# Patient Record
Sex: FEMALE | Race: WHITE | Employment: OTHER | URBAN - METROPOLITAN AREA
[De-identification: names, ages, dates, MRNs, and addresses within clinical notes are randomized per-mention and may not be internally consistent; named-entity substitution may affect disease eponyms.]

---

## 2017-02-01 ENCOUNTER — APPOINTMENT (OUTPATIENT)
Dept: LAB | Age: 77
End: 2017-02-01
Attending: CHIROPRACTOR
Payer: MEDICARE

## 2017-02-01 DIAGNOSIS — T15.81XA FOREIGN BODY OF SCLERA OF RIGHT EYE, INITIAL ENCOUNTER: ICD-10-CM

## 2017-02-01 DIAGNOSIS — T15.81XA: ICD-10-CM

## 2017-02-01 PROCEDURE — 88305 TISSUE EXAM BY PATHOLOGIST: CPT

## 2017-04-28 ENCOUNTER — HOSPITAL ENCOUNTER (OUTPATIENT)
Dept: BONE DENSITY | Age: 77
Discharge: HOME | End: 2017-04-28
Attending: SPECIALIST
Payer: MEDICARE

## 2017-04-28 DIAGNOSIS — T38.6X5A OSTEOPOROSIS DUE TO AROMATASE INHIBITOR: ICD-10-CM

## 2017-04-28 DIAGNOSIS — M81.8 OSTEOPOROSIS DUE TO AROMATASE INHIBITOR: ICD-10-CM

## 2017-04-28 PROCEDURE — 77080 DXA BONE DENSITY AXIAL: CPT

## 2017-05-12 ENCOUNTER — OFFICE VISIT (OUTPATIENT)
Dept: HEMATOLOGY/ONCOLOGY | Facility: HOSPITAL | Age: 77
End: 2017-05-12
Attending: SPECIALIST
Payer: MEDICARE

## 2017-05-12 VITALS
OXYGEN SATURATION: 97 % | HEART RATE: 85 BPM | WEIGHT: 146.38 LBS | RESPIRATION RATE: 18 BRPM | TEMPERATURE: 97 F | HEIGHT: 62.01 IN | DIASTOLIC BLOOD PRESSURE: 81 MMHG | SYSTOLIC BLOOD PRESSURE: 153 MMHG | BODY MASS INDEX: 26.6 KG/M2

## 2017-05-12 DIAGNOSIS — G35 MULTIPLE SCLEROSIS (HCC): Primary | ICD-10-CM

## 2017-05-12 DIAGNOSIS — M81.8 OSTEOPOROSIS DUE TO AROMATASE INHIBITOR: ICD-10-CM

## 2017-05-12 DIAGNOSIS — Z17.0 MALIGNANT NEOPLASM OF UPPER-OUTER QUADRANT OF RIGHT BREAST IN FEMALE, ESTROGEN RECEPTOR POSITIVE (HCC): ICD-10-CM

## 2017-05-12 DIAGNOSIS — T38.6X5A OSTEOPOROSIS DUE TO AROMATASE INHIBITOR: ICD-10-CM

## 2017-05-12 DIAGNOSIS — C50.411 MALIGNANT NEOPLASM OF UPPER-OUTER QUADRANT OF RIGHT BREAST IN FEMALE, ESTROGEN RECEPTOR POSITIVE (HCC): ICD-10-CM

## 2017-05-12 PROCEDURE — 99214 OFFICE O/P EST MOD 30 MIN: CPT | Performed by: SPECIALIST

## 2017-05-12 RX ORDER — ANASTROZOLE 1 MG/1
1 TABLET ORAL DAILY
Qty: 90 TABLET | Refills: 3 | Status: SHIPPED | COMMUNITY
Start: 2017-05-12 | End: 2018-04-04

## 2017-05-12 NOTE — PROGRESS NOTES
Patient is here today for follow up with Dr. Miko Felix for breast cancer. Patient stated back pain today is rated an 8 on a scale of 0-10 with movement. Patient is on Anastrozole therapy. Medication list and medical history were reviewed and updated.     Educ

## 2017-05-13 PROBLEM — M81.0 OSTEOPOROSIS: Status: ACTIVE | Noted: 2017-05-13

## 2017-05-14 NOTE — PROGRESS NOTES
[facility] Progress Note      Patient Name: Pratima Cobb   YOB: 1940  Medical Record Number: [id number]  Attending Physician: Rayna Wheeler M.D.      Date of Visit: 5/12/2017      Chief Complaint  Breast cancer, right sided, uppe 15 years; menopause age 35+; first full term pregnancy age 31 years; ; no history of hormone replacement therapy or OCP. Family History (historical data, reviewed)  Mother with bladder cancer.     Social History   Current tobacco use of 0.5 packs/day palpable masses bilaterally. Respiratory Normal effort; no respiratory distress; clear to auscultation bilaterally. Cardiovascular Regular rate and rhythm; normal S1S2. Abdomen Soft; no masses. Integumentary Skin is warm and dry; no rashes.    Neurolo zoledronic acid therapy; she will return in 6 months for follow up. Risk Level: High - breast cancer on therapy. Electronically signed by:    Alfonzo Phipps M.D.   [facility]  Director, Bone and Soft Tissue Sarcoma Program  S

## 2017-05-30 ENCOUNTER — TELEPHONE (OUTPATIENT)
Dept: HEMATOLOGY/ONCOLOGY | Facility: HOSPITAL | Age: 77
End: 2017-05-30

## 2017-05-30 NOTE — TELEPHONE ENCOUNTER
MD Rosangela Kirkpatrick, RN                     When I saw her, I gave her order for vitamin D level which she was going to get done at [address] with other labs ordered by her PCP. Can you find out if lab done?  If not, can you ask patient whe

## 2017-06-09 NOTE — TELEPHONE ENCOUNTER
Telephone call from patient stated had labs drawn today at [address]. Results should be faxed next week.

## 2017-10-23 ENCOUNTER — TELEPHONE (OUTPATIENT)
Dept: HEMATOLOGY/ONCOLOGY | Facility: HOSPITAL | Age: 77
End: 2017-10-23

## 2017-10-23 DIAGNOSIS — C50.411 MALIGNANT NEOPLASM OF UPPER-OUTER QUADRANT OF RIGHT BREAST IN FEMALE, ESTROGEN RECEPTOR POSITIVE (HCC): Primary | ICD-10-CM

## 2017-10-23 DIAGNOSIS — Z17.0 MALIGNANT NEOPLASM OF UPPER-OUTER QUADRANT OF RIGHT BREAST IN FEMALE, ESTROGEN RECEPTOR POSITIVE (HCC): Primary | ICD-10-CM

## 2018-04-06 RX ORDER — ANASTROZOLE 1 MG/1
TABLET ORAL
Qty: 90 TABLET | Refills: 0 | Status: SHIPPED | OUTPATIENT
Start: 2018-04-06 | End: 2020-06-05

## 2020-06-05 ENCOUNTER — APPOINTMENT (OUTPATIENT)
Dept: GENERAL RADIOLOGY | Facility: HOSPITAL | Age: 80
DRG: 607 | End: 2020-06-05
Attending: EMERGENCY MEDICINE
Payer: MEDICARE

## 2020-06-05 ENCOUNTER — HOSPITAL ENCOUNTER (INPATIENT)
Facility: HOSPITAL | Age: 80
LOS: 2 days | Discharge: NURSING, REHAB OR LONG-TERM CARE | DRG: 607 | End: 2020-06-10
Attending: EMERGENCY MEDICINE | Admitting: INTERNAL MEDICINE
Payer: MEDICARE

## 2020-06-05 ENCOUNTER — APPOINTMENT (OUTPATIENT)
Dept: CT IMAGING | Facility: HOSPITAL | Age: 80
DRG: 607 | End: 2020-06-05
Attending: EMERGENCY MEDICINE
Payer: MEDICARE

## 2020-06-05 ENCOUNTER — APPOINTMENT (OUTPATIENT)
Dept: ULTRASOUND IMAGING | Facility: HOSPITAL | Age: 80
DRG: 607 | End: 2020-06-05
Attending: EMERGENCY MEDICINE
Payer: MEDICARE

## 2020-06-05 DIAGNOSIS — R77.8 ELEVATED TROPONIN: ICD-10-CM

## 2020-06-05 DIAGNOSIS — I89.0 LYMPHEDEMA: Primary | ICD-10-CM

## 2020-06-05 PROBLEM — R79.89 ELEVATED TROPONIN: Status: ACTIVE | Noted: 2020-06-05

## 2020-06-05 PROCEDURE — 99221 1ST HOSP IP/OBS SF/LOW 40: CPT | Performed by: INTERNAL MEDICINE

## 2020-06-05 PROCEDURE — 99220 INITIAL OBSERVATION CARE,LEVL III: CPT | Performed by: HOSPITALIST

## 2020-06-05 PROCEDURE — 93970 EXTREMITY STUDY: CPT | Performed by: EMERGENCY MEDICINE

## 2020-06-05 PROCEDURE — 71275 CT ANGIOGRAPHY CHEST: CPT | Performed by: EMERGENCY MEDICINE

## 2020-06-05 PROCEDURE — 71045 X-RAY EXAM CHEST 1 VIEW: CPT | Performed by: EMERGENCY MEDICINE

## 2020-06-05 RX ORDER — KETOROLAC TROMETHAMINE 30 MG/ML
30 INJECTION, SOLUTION INTRAMUSCULAR; INTRAVENOUS EVERY 6 HOURS PRN
Status: DISCONTINUED | OUTPATIENT
Start: 2020-06-05 | End: 2020-06-05 | Stop reason: DRUGHIGH

## 2020-06-05 RX ORDER — MORPHINE SULFATE 4 MG/ML
4 INJECTION, SOLUTION INTRAMUSCULAR; INTRAVENOUS EVERY 2 HOUR PRN
Status: DISCONTINUED | OUTPATIENT
Start: 2020-06-05 | End: 2020-06-06

## 2020-06-05 RX ORDER — ASPIRIN 325 MG
325 TABLET ORAL DAILY
Status: DISCONTINUED | OUTPATIENT
Start: 2020-06-05 | End: 2020-06-07

## 2020-06-05 RX ORDER — LEVOTHYROXINE SODIUM 0.07 MG/1
75 TABLET ORAL
Status: DISCONTINUED | OUTPATIENT
Start: 2020-06-06 | End: 2020-06-10

## 2020-06-05 RX ORDER — FUROSEMIDE 10 MG/ML
40 INJECTION INTRAMUSCULAR; INTRAVENOUS DAILY
Status: DISCONTINUED | OUTPATIENT
Start: 2020-06-05 | End: 2020-06-05

## 2020-06-05 RX ORDER — METOCLOPRAMIDE HYDROCHLORIDE 5 MG/ML
10 INJECTION INTRAMUSCULAR; INTRAVENOUS EVERY 8 HOURS PRN
Status: DISCONTINUED | OUTPATIENT
Start: 2020-06-05 | End: 2020-06-07

## 2020-06-05 RX ORDER — KETOROLAC TROMETHAMINE 30 MG/ML
30 INJECTION, SOLUTION INTRAMUSCULAR; INTRAVENOUS ONCE
Status: COMPLETED | OUTPATIENT
Start: 2020-06-05 | End: 2020-06-05

## 2020-06-05 RX ORDER — LEVOTHYROXINE SODIUM 0.07 MG/1
75 TABLET ORAL
COMMUNITY

## 2020-06-05 RX ORDER — MORPHINE SULFATE 4 MG/ML
2 INJECTION, SOLUTION INTRAMUSCULAR; INTRAVENOUS EVERY 2 HOUR PRN
Status: DISCONTINUED | OUTPATIENT
Start: 2020-06-05 | End: 2020-06-06

## 2020-06-05 RX ORDER — HYDROCODONE BITARTRATE AND ACETAMINOPHEN 5; 325 MG/1; MG/1
1 TABLET ORAL EVERY 6 HOURS PRN
Status: DISCONTINUED | OUTPATIENT
Start: 2020-06-05 | End: 2020-06-07

## 2020-06-05 RX ORDER — ENOXAPARIN SODIUM 100 MG/ML
40 INJECTION SUBCUTANEOUS EVERY EVENING
Status: DISCONTINUED | OUTPATIENT
Start: 2020-06-05 | End: 2020-06-07

## 2020-06-05 RX ORDER — KETOROLAC TROMETHAMINE 30 MG/ML
15 INJECTION, SOLUTION INTRAMUSCULAR; INTRAVENOUS EVERY 6 HOURS PRN
Status: DISCONTINUED | OUTPATIENT
Start: 2020-06-05 | End: 2020-06-07

## 2020-06-05 RX ORDER — ACETAMINOPHEN 325 MG/1
650 TABLET ORAL EVERY 6 HOURS PRN
Status: DISCONTINUED | OUTPATIENT
Start: 2020-06-05 | End: 2020-06-10

## 2020-06-05 RX ORDER — BACLOFEN 10 MG/1
5 TABLET ORAL 3 TIMES DAILY
Status: DISCONTINUED | OUTPATIENT
Start: 2020-06-05 | End: 2020-06-06

## 2020-06-05 RX ORDER — MORPHINE SULFATE 4 MG/ML
1 INJECTION, SOLUTION INTRAMUSCULAR; INTRAVENOUS EVERY 2 HOUR PRN
Status: DISCONTINUED | OUTPATIENT
Start: 2020-06-05 | End: 2020-06-06

## 2020-06-05 RX ORDER — ONDANSETRON 2 MG/ML
4 INJECTION INTRAMUSCULAR; INTRAVENOUS EVERY 6 HOURS PRN
Status: DISCONTINUED | OUTPATIENT
Start: 2020-06-05 | End: 2020-06-10

## 2020-06-05 NOTE — CONSULTS
[facility]  Cardiology Consultation    [facility] Patient Status:  Observation    1940 MRN [id number]   [facility] 3NE-A Attending Hunter Duran MD   Hosp Day # 0 PCP Dat Taveras MD     Reason for Consultation:  Amos Brandt She has never used smokeless tobacco. She reports that she does not drink alcohol or use drugs.     Allergies:  No Known Allergies    Medications:    Current Facility-Administered Medications:   •  [START ON 6/6/2020] Levothyroxine Sodium tab 75 mcg, 75 mcg b/l in LE with mild erythema above ankles  Neurologic: Alert and oriented, normal affect. Skin: Warm and dry.      Laboratory Data:  Lab Results   Component Value Date    WBC 5.8 06/05/2020    HGB 12.0 06/05/2020    HCT 36.0 06/05/2020    .0 06/05/2

## 2020-06-05 NOTE — ED NOTES
Ct Angiography, Chest (cpt=71275)    Result Date: 6/5/2020  PROCEDURE:  CT ANGIOGRAPHY, CHEST (CPT=71275)  COMPARISON:  None.   INDICATIONS:  leg pain, elevated ddimer, eval for PE  TECHNIQUE:  IV contrast-enhanced multislice CT angiography is performed thr Venous Doppler Leg Bilat - Diag Img (cpt=93970)    Result Date: 6/5/2020  PROCEDURE:  US VENOUS DOPPLER LEG BILAT - DIAG IMG (CPT=93970)  COMPARISON:  None.   INDICATIONS:  leg pain  TECHNIQUE:  Real time, grey scale, and duplex ultrasound was used to evalu Finalized by: Rosa Khan MD on 6/05/2020 at 10:14 AM        Patient was signed out that if the CT shows no pulmonary embolism patient will need can be admitted.   CT shows no PE.

## 2020-06-05 NOTE — ED NOTES
Pt returned from CT. Tolerated well. Denies new complaints. Pain improved. Awaiting CT results for admission.

## 2020-06-05 NOTE — H&P
[facility]  History and Physical     Cristhian Escort Patient Status:  Emergency    1940 MRN [id number]   [address] Attending WillMar MD   Hosp Day # 0 PCP Servando Rivera MD     Chief Compla REPAIR RETINAL DETACH, CPLX  04/2013    right eye   • TONSILLECTOMY         Social History:  reports that she has been smoking. She has been smoking about 0.50 packs per day.  She has never used smokeless tobacco. She reports that she does not drink alcohol 0.197*          Imaging: Imaging data reviewed in Epic. ASSESSMENT / PLAN:     1. Right groin pain, tender on exam, appears to have pulled muscle  1. Muscle relaxants  2. Pain control  3. PT/OT evaluation  2.  Lymphedema, doppler negative - patie

## 2020-06-05 NOTE — ED INITIAL ASSESSMENT (HPI)
Arrives via [facility] from home with c/o right leg pain. Normally uses a motorized w/c, d/t MS, and a walker. States was attempting to get up to use the BR about a week ago and had a sock on her right foot.   When grabbed the bar, leg slid and began havi

## 2020-06-05 NOTE — ED PROVIDER NOTES
Patient Seen in: [facility] Emergency Department      History   Patient presents with:  Lower Extremity Injury    Stated Complaint: leg pain    HPI    The patient is an 50-year-old female with a history of multiple sclerosis that is progressively be at [address] [facility] MAIN OR   • [place]   • LUMPECTOMY RIGHT Right 9/2014    [facility]   • NEEDLE BIOPSY RIGHT Right 8/2014    US guided biopsy - [facility]   • OTHER      detached retina repair right eye   • OTHER SURGICAL HISTORY  9/2014    Right ananda Speaking full sentences without any distress or retractions. Clear to auscultation bilaterally no wheezes or rales or rhonchi. Abdomen: Normoactive bowel sounds. Soft, nondistended. No HSM or masses.  Nontender throughout abdomen to superficial and deep pa created for panel order CBC WITH DIFFERENTIAL WITH PLATELET.   Procedure                               Abnormality         Status                     ---------                               -----------         ------                     CBC W/ DIFFERENTIAL[ transcribed by Technologist)  bilateral lower     extremity pain               FINDINGS:  Covid-19 protocol. Compression is demonstrated of the common     femoral, superficial femoral and popliteal venous segments bilaterally.          CONCLUSION:  [facility] service will admit the patient, for diuresis, and likely case management evaluation. At change of shift, CTA of her chest is still pending.   I discussed this patient with the oncoming ER physician will follow up with a CTA chest, and patient will be admit

## 2020-06-06 ENCOUNTER — APPOINTMENT (OUTPATIENT)
Dept: CV DIAGNOSTICS | Facility: HOSPITAL | Age: 80
DRG: 607 | End: 2020-06-06
Attending: HOSPITALIST
Payer: MEDICARE

## 2020-06-06 PROBLEM — R26.9 GAIT DIFFICULTY: Status: ACTIVE | Noted: 2020-06-06

## 2020-06-06 PROCEDURE — 93306 TTE W/DOPPLER COMPLETE: CPT | Performed by: HOSPITALIST

## 2020-06-06 PROCEDURE — 99226 SUBSEQUENT OBSERVATION CARE: CPT | Performed by: HOSPITALIST

## 2020-06-06 PROCEDURE — 99232 SBSQ HOSP IP/OBS MODERATE 35: CPT | Performed by: INTERNAL MEDICINE

## 2020-06-06 PROCEDURE — 99223 1ST HOSP IP/OBS HIGH 75: CPT | Performed by: OTHER

## 2020-06-06 RX ORDER — KETOROLAC TROMETHAMINE 30 MG/ML
30 INJECTION, SOLUTION INTRAMUSCULAR; INTRAVENOUS EVERY 6 HOURS PRN
Status: DISCONTINUED | OUTPATIENT
Start: 2020-06-06 | End: 2020-06-06

## 2020-06-06 RX ORDER — KETOROLAC TROMETHAMINE 30 MG/ML
15 INJECTION, SOLUTION INTRAMUSCULAR; INTRAVENOUS EVERY 6 HOURS PRN
Status: DISCONTINUED | OUTPATIENT
Start: 2020-06-06 | End: 2020-06-06

## 2020-06-06 RX ORDER — BACLOFEN 10 MG/1
5 TABLET ORAL 3 TIMES DAILY PRN
Status: DISCONTINUED | OUTPATIENT
Start: 2020-06-06 | End: 2020-06-10

## 2020-06-06 RX ORDER — POTASSIUM CHLORIDE 20 MEQ/1
40 TABLET, EXTENDED RELEASE ORAL EVERY 4 HOURS
Status: COMPLETED | OUTPATIENT
Start: 2020-06-06 | End: 2020-06-06

## 2020-06-06 NOTE — PLAN OF CARE
A&O x 4. Room air. Tele, NSR. Bumex drip. Bilateral lower extremity edema noted. Daily wt. Bertha Ip in. I&O's. Lovenox. Denied pain. VSS. Total lift, per PT. Cardiac diet. Will cont to monitor.           Problem: Patient/Family Goals  Goal: Patient based on assessment  - Modify environment to reduce risk of injury  - Provide assistive devices as appropriate  - Consider OT/PT consult to assist with strengthening/mobility  - Encourage toileting schedule  Outcome: Progressing     Problem: DISCHARGE PLAN

## 2020-06-06 NOTE — CM/SW NOTE
06/06/20 1500   CM/SW Referral Data   Referral Source Social Work (self-referral)   Reason for Referral Discharge planning   Informant Patient   Patient Info   Patient's Mental Status Alert;Oriented   Patient's [address]  [facility] Chat[employer]ingual

## 2020-06-06 NOTE — PLAN OF CARE
Received patient awake in bed. AOx4. Pleasant. On room air, clear lungs. NSR on tele. Electrolyte protocol. On Lovenox for VTE. Daily weights. On pure wick with large amount of output. On Bumex drip at 1mg/hr. PT/OT to see. Neurology to see.  Has 2 small marleen Identify cognitive and physical deficits and behaviors that affect risk of falls.   - [place] fall precautions as indicated by assessment.  - Educate pt/family on patient safety including physical limitations  - Instruct pt to call for assistance with act

## 2020-06-06 NOTE — PLAN OF CARE
Assumed pt care from ER  A/O x4   Room air   NSR on tele  [place] cath was put in place and works  Denies of pain   Cardiac diet   Electrolyte protocol   Daily weight  Lovenox   Left AC SL  One time dose lasix  All needs are met and will continue to monitor Progressing  6/5/2020 1905 by Anila Mckeon, RN  Outcome: Progressing  6/5/2020 1812 by Anila Mckeon, RN  Outcome: Progressing     Problem: SAFETY ADULT - FALL  Goal: Free from fall injury  Description  INTERVENTIONS:  - Assess pt frequently for physica RN  Outcome: Progressing

## 2020-06-06 NOTE — PROGRESS NOTES
[facility]  Cardiology Progress Note    Subjective:  No chest pain or shortness of breath. Urinating \"a lot\".       Objective:  /55 (BP Location: Left arm)   Pulse 102   Temp 98.2 °F (36.8 °C) (Oral)   Resp 18   Ht 5' 2\" (1.575 m)   Wt 173 lb long-standing for >20 years  · Hypothyroidism  · Hx Breast Ca  · Mildly elevated troponin - echo pending. Not a candidate for any invasive procedures as outlined by Dr. Jose Manuel Dwyer.     Plan:    - Continue bumex gtt  - Await echo  - Once distal lower extremi

## 2020-06-06 NOTE — CONSULTS
[facility]    Report of Consultation    Yonas Purpura Patient Status:  Observation    1940 MRN [id number]   [facility] 3NE-A Attending Remigio Guerra MD   Hosp Day # 0 PCP Chelsea Skelton MD     Date of Admission:  20 9/2014    [facility]   • NEEDLE BIOPSY RIGHT Right 8/2014    US guided biopsy - [facility]   • OTHER      detached retina repair right eye   • OTHER SURGICAL HISTORY  9/2014    Right breast lumpectomy   • RADIATION RIGHT  9/2014   • REPAIR RETINAL DETACH, CPLX  04/2013 Clear to auscultation bilaterally. HEART:  S1, S2, Regular rate and rhythm. NEUROLOGICAL:  This patient is alert and orientated x 3. Speech fluent. Able to follow simple commands. Face is symmetrical. Pupils equally round and reactive to light.   3+ br

## 2020-06-06 NOTE — PHYSICAL THERAPY NOTE
PHYSICAL THERAPY EVALUATION - INPATIENT     Room Number: 7182/1564-I  Evaluation Date: 6/6/2020  Type of Evaluation: Initial  Physician Order: PT Eval and Treat    Presenting Problem: RLE pain, lymphedema  Reason for Therapy: Mobility Dysfunction and foot slip out from under her on commode and she began having R thigh/groin pain. She also reports injuring her L shoulder 2 weeks ago. Pt has been unable to get into her bed and was sleeping in dining room chair.  She has part time caregiver for errands/malika bedclothes, sheets and blankets)?: A Lot   -   Sitting down on and standing up from a chair with arms (e.g., wheelchair, bedside commode, etc.): Unable   -   Moving from lying on back to sitting on the side of the bed?: A Lot   How much help from another p with the following impairments decreased AROM/strength BUE/LEs,lymphedema, decreased balance, pain with mobility, impaired activity tolerance. Functional outcome measures completed include AMPAC.   Based on this evaluation, patient's clinical presentation

## 2020-06-06 NOTE — PROGRESS NOTES
[facility]  Progress Note     Amita Espinosa Patient Status:  Observation    1940 MRN [id number]   [facility] 3NE-A Attending Alessandra Dumont MD   Hosp Day # 0 PCP Neo Matos MD     Chief Complaint: Lymphedema    S: Pa mEq Oral Q4H   • Levothyroxine Sodium  75 mcg Oral Before breakfast   • enoxaparin  40 mg Subcutaneous QPM   • aspirin  325 mg Oral Daily       ASSESSMENT / PLAN:     1. Right groin pain, tender on exam, appears to have pulled muscle, improving  1.  Muscle

## 2020-06-07 PROCEDURE — 99232 SBSQ HOSP IP/OBS MODERATE 35: CPT | Performed by: NURSE PRACTITIONER

## 2020-06-07 PROCEDURE — 99233 SBSQ HOSP IP/OBS HIGH 50: CPT | Performed by: HOSPITALIST

## 2020-06-07 RX ORDER — ASPIRIN 81 MG/1
81 TABLET ORAL DAILY
Status: DISCONTINUED | OUTPATIENT
Start: 2020-06-07 | End: 2020-06-10

## 2020-06-07 RX ORDER — METOCLOPRAMIDE HYDROCHLORIDE 5 MG/ML
5 INJECTION INTRAMUSCULAR; INTRAVENOUS EVERY 8 HOURS PRN
Status: DISCONTINUED | OUTPATIENT
Start: 2020-06-07 | End: 2020-06-09

## 2020-06-07 RX ORDER — POLYETHYLENE GLYCOL 3350 17 G/17G
17 POWDER, FOR SOLUTION ORAL DAILY
Status: DISCONTINUED | OUTPATIENT
Start: 2020-06-07 | End: 2020-06-10

## 2020-06-07 RX ORDER — TORSEMIDE 20 MG/1
20 TABLET ORAL DAILY
Status: DISCONTINUED | OUTPATIENT
Start: 2020-06-08 | End: 2020-06-08

## 2020-06-07 RX ORDER — ENOXAPARIN SODIUM 100 MG/ML
30 INJECTION SUBCUTANEOUS EVERY EVENING
Status: DISCONTINUED | OUTPATIENT
Start: 2020-06-07 | End: 2020-06-09

## 2020-06-07 RX ORDER — SENNOSIDES 8.6 MG
8.6 TABLET ORAL 2 TIMES DAILY
Status: DISCONTINUED | OUTPATIENT
Start: 2020-06-07 | End: 2020-06-10

## 2020-06-07 RX ORDER — DOCUSATE SODIUM 100 MG/1
100 CAPSULE, LIQUID FILLED ORAL 2 TIMES DAILY
Status: DISCONTINUED | OUTPATIENT
Start: 2020-06-07 | End: 2020-06-10

## 2020-06-07 NOTE — PROGRESS NOTES
[facility]  Progress Note     Marycruz Conner Patient Status:  Observation    1940 MRN [id number]   [facility] 3NE-A Attending Sergei Fenton MD   Hosp Day # 0 PCP Ana Don MD     Chief Complaint: Lymphedema    S: Pa (H)).    No results for input(s): PTP, INR in the last 168 hours. Recent Labs   Lab 06/05/20  0913   TROP 0.197*               Imaging: Imaging data reviewed in Epic.     Medications:   • aspirin EC  81 mg Oral Daily   • [START ON 6/8/2020] jeremy MD

## 2020-06-07 NOTE — CM/SW NOTE
NASIR spoke to RN and discussed contact information for sonRigo. The phone numbers in Epic are not correct and correct number for Rigo beck is [phone]. Epic is now updated. NASIR explained to Rigo Patel that referrals for SNF were sent via [facility].   Rigo Patel states th

## 2020-06-07 NOTE — PLAN OF CARE
Assumed patient care at 1900  Patient A&O x 4  Complaints of pain to (R) groin. Patient refusing baclofen and norco but did take tylenol and heat packs applied to area.  Frequent repositioning offered   VSS  Tele-SR  Lovenox Sub Q  Daily weight  Strict I&O FALL  Goal: Free from fall injury  Description  INTERVENTIONS:  - Assess pt frequently for physical needs  - Identify cognitive and physical deficits and behaviors that affect risk of falls.   - [place] fall precautions as indicated by assessment.  - Educ

## 2020-06-07 NOTE — PROGRESS NOTES
[facility]  Cardiology Progress Note    Subjective:  No chest pain or shortness of breath. Feels weak. Edema close to baseline.     Objective:  /62 (BP Location: Left arm)   Pulse 96   Temp 98.3 °F (36.8 °C) (Oral)   Resp 20   Ht 5' 2\" (1.575 chronic lymphedema - Echo wihtout diastolic dysfunction and preserved LVSF. Was placed on bumex gtt w/ net volume losses thus far ~6Liters. Tells me she had NOT been on oral diuretics at home.   Reports she has tried compression therapy in the past but sh

## 2020-06-07 NOTE — CM/SW NOTE
SW spoke to [place]. Pt is now considering [facility] placement. Pt's son is requesting a call. SW attempted to speak with pt's Jimbo Jeanna [phone] and [phone]. SW unable to leave a message and both phone numbers ring.     SW completed SNF referrals for pt via

## 2020-06-07 NOTE — PROGRESS NOTES
Pharmacy Note: Renal dose adjustment for Metoclopramide (Reglan)  Johanna Falcon has been prescribed Metoclopramide (Reglan) 10 mg every 8 hours as needed. Estimated Creatinine Clearance: 28.2 mL/min (A) (based on SCr of 1.26 mg/dL (H)).     Her calculate

## 2020-06-07 NOTE — PLAN OF CARE
Assumed patient care at 0700  Patient A&O x 4  Complaints of generalized pain, Frequent repositioning offered, Tylenol given   VSS  Tele-SR  Lovenox Sub Q  Daily weight  Strict I&O   Purewick in place  Bumex drip d/c this am, will begin PO diuretics tomor FALL  Goal: Free from fall injury  Description  INTERVENTIONS:  - Assess pt frequently for physical needs  - Identify cognitive and physical deficits and behaviors that affect risk of falls.   - [place] fall precautions as indicated by assessment.  - Educ

## 2020-06-07 NOTE — PROGRESS NOTES
[facility] Pharmacy Note: Renal dose adjustment for Enoxaparin (Lovenox)  Johanna Pedraza has been prescribed Enoxaparin (Lovenox)  40 mg subcutaneously every 24 hours. Estimated Creatinine Clearance: 28.2 mL/min (A) (based on SCr of 1.26 mg/dL (H)).     Her becca

## 2020-06-08 PROBLEM — I50.9 CHF (CONGESTIVE HEART FAILURE) (HCC): Status: ACTIVE | Noted: 2020-06-08

## 2020-06-08 PROCEDURE — 99232 SBSQ HOSP IP/OBS MODERATE 35: CPT | Performed by: NURSE PRACTITIONER

## 2020-06-08 PROCEDURE — 99233 SBSQ HOSP IP/OBS HIGH 50: CPT | Performed by: HOSPITALIST

## 2020-06-08 RX ORDER — AMMONIUM LACTATE 12 G/100G
LOTION TOPICAL AS NEEDED
Status: DISCONTINUED | OUTPATIENT
Start: 2020-06-08 | End: 2020-06-10

## 2020-06-08 RX ORDER — SODIUM CHLORIDE 9 MG/ML
INJECTION, SOLUTION INTRAVENOUS CONTINUOUS
Status: DISCONTINUED | OUTPATIENT
Start: 2020-06-08 | End: 2020-06-09

## 2020-06-08 NOTE — PLAN OF CARE
Patient is A&O x4. VSS- NSR on tele with occasional PVCs. SpO2 remains stable on RA. Patient c/o right leg pain- PRN tylenol and hot packs used. Patient denies nausea/SOB/headache. BLE lymphedema noted R>L- plan to start PO diuretics per cards tomorrow.  PI fall injury  Description  INTERVENTIONS:  - Assess pt frequently for physical needs  - Identify cognitive and physical deficits and behaviors that affect risk of falls.   - [place] fall precautions as indicated by assessment.  - Educate pt/family on patie

## 2020-06-08 NOTE — PLAN OF CARE
Assumed care at 0730. A&O x 4. On RA tolerating well. NSR on tele with occasional PVCs. Patient has bilateral lymphedema, diuretic stopped, fluids started d/t creatine.  Patient is a total lift, reluctant to get out of bed, both Mds education patient on imp fall injury  Description  INTERVENTIONS:  - Assess pt frequently for physical needs  - Identify cognitive and physical deficits and behaviors that affect risk of falls.   - [place] fall precautions as indicated by assessment.  - Educate pt/family on patie

## 2020-06-08 NOTE — CONSULTS
[facility]  Inpatient Wound Care Contact Note    Loretta Jeff Patient Status:  Inpatient    1940 MRN [id number]   [facility] 3NE-A Attending Kofi Martinez MD   Hosp Day # 0 PCP Denilson Stevenson MD     Consult received, exmikey

## 2020-06-08 NOTE — PROGRESS NOTES
Brief Note    Recent Labs   Lab 06/06/20  0642 06/06/20  1611 06/07/20  0713 06/08/20  0659   GLU 85  --  83 85   BUN 22*  --  25* 34*   CREATSERUM 0.56  --  1.26* 2.26*   GFRAA 102  --  46* 23*   GFRNAA 88  --  40* 20*   CA 8.4*  --  8.7 8.5     --

## 2020-06-08 NOTE — OCCUPATIONAL THERAPY NOTE
OCCUPATIONAL THERAPY EVALUATION - INPATIENT     Room Number: 3197/8370-W  Evaluation Date: 6/8/2020  Type of Evaluation: Initial  Presenting Problem: Lymphedema    Physician Order: IP Consult to Occupational Therapy  Reason for Therapy: ADL/IADL Dysfunctio retired  Hand Dominance: Right          Prior Level of Function: Pt reports IND at home several weeks PTA, however has needed increased assist 2/2 slow progressing MS. Pt reports pulling groin muscle PTA during fall in bathroom several weeks PTA.  Pt has si rinsing, drying)?: Total  -   Toileting, which includes using toilet, bedpan or urinal? : Total  -   Putting on and taking off regular upper body clothing?: A Lot  -   Taking care of personal grooming such as brushing teeth?: A Little  -   Eating meals?: A transfers. The patient is functioning below her previous functional level and would benefit from skilled inpatient OT to address the above deficits, maximizing patient’s ability to return safely to her prior level of function.     Patient Complexity  Occ

## 2020-06-08 NOTE — PROGRESS NOTES
[facility]  Cardiology Progress Note    Subjective:  No chest pain or shortness of breath. Creatinine elevated today.     Objective:  /59 (BP Location: Left arm)   Pulse 92   Temp 99 °F (37.2 °C) (Oral)   Resp 18   Ht 62\"   Wt 174 lb 3.2 oz   S asymptomatic PAT    Assessment:    · RLE/groin pain - felt to be muscular  · Progressive edema w/ known chronic lymphedema - Echo wihtout diastolic dysfunction and preserved LVSF.   Was placed on bumex gtt w/ net volume losses thus far ~6.7Liters, now with

## 2020-06-08 NOTE — PROGRESS NOTES
[facility]  Progress Note     Yani Laona Patient Status:  Observation    1940 MRN [id number]   [facility] 3NE-A Attending Stanford Barboza MD   Hosp Day # 0 PCP Mary Navarro MD     Chief Complaint: Lymphedema    S: Pa TP 6.0* 6.1*  --   --   --        Estimated Creatinine Clearance: 15.7 mL/min (A) (based on SCr of 2.26 mg/dL (H)). No results for input(s): PTP, INR in the last 168 hours.     Recent Labs   Lab 06/05/20  0913   TROP 0.197*               Imaging:

## 2020-06-09 PROCEDURE — 99232 SBSQ HOSP IP/OBS MODERATE 35: CPT | Performed by: HOSPITALIST

## 2020-06-09 PROCEDURE — 99232 SBSQ HOSP IP/OBS MODERATE 35: CPT | Performed by: NURSE PRACTITIONER

## 2020-06-09 RX ORDER — POTASSIUM CHLORIDE 20 MEQ/1
40 TABLET, EXTENDED RELEASE ORAL EVERY 4 HOURS
Status: COMPLETED | OUTPATIENT
Start: 2020-06-09 | End: 2020-06-09

## 2020-06-09 RX ORDER — METOCLOPRAMIDE HYDROCHLORIDE 5 MG/ML
10 INJECTION INTRAMUSCULAR; INTRAVENOUS EVERY 8 HOURS PRN
Status: DISCONTINUED | OUTPATIENT
Start: 2020-06-09 | End: 2020-06-10

## 2020-06-09 RX ORDER — ENOXAPARIN SODIUM 100 MG/ML
40 INJECTION SUBCUTANEOUS EVERY EVENING
Status: DISCONTINUED | OUTPATIENT
Start: 2020-06-09 | End: 2020-06-10

## 2020-06-09 NOTE — CM/SW NOTE
MSW reviewed chart, and spoke to son Brett Mora who wants [facility]. MSW spoke to Admissions at [facility] and they will not accept patient if pt is on any IVABEX. MSW updated son on this barrier and to keep 2nd choice in mind.     2pm  MSW spoke to bedside

## 2020-06-09 NOTE — PLAN OF CARE
Assumed pt care at 0730. A&Ox4. Room air. VSS. NSR on tele. Right arm precautions maintained. L AC PIV SL. Cardiac diet, pt tolerating well. Denies N/V. Reports 5/10 pain in BLE, addressed with PRN tylenol. Total lift.    Lovenox SQ for VTE pre activity and pre-medicate as appropriate  Outcome: Progressing     Problem: SAFETY ADULT - FALL  Goal: Free from fall injury  Description  INTERVENTIONS:  - Assess pt frequently for physical needs  - Identify cognitive and physical deficits and behaviors t Impaired Activities of Daily Living  Goal: Achieve highest/safest level of independence in self care  Description  Interventions:  - Assess ability and encourage patient to participate in ADLs to maximize function  - Promote sitting position while performi

## 2020-06-09 NOTE — PROGRESS NOTES
[facility]  Cardiology Progress Note    Subjective:  No chest pain or shortness of breath.     Objective:  /62 (BP Location: Left arm)   Pulse 83   Temp 98.9 °F (37.2 °C) (Oral)   Resp 14   Ht 62\"   Wt 168 lb   SpO2 96%   BMI 30.73 kg/m²     Lab PVC's    Assessment:    · RLE/groin pain - felt to be muscular  · Progressive edema w/ known chronic lymphedema - Echo wihtout diastolic dysfunction and preserved LVSF.  Was placed on bumex gtt, then ROBBIE so diuretics stopped.   Renal fx now normalized  · AK

## 2020-06-09 NOTE — PHYSICAL THERAPY NOTE
PHYSICAL THERAPY TREATMENT NOTE - INPATIENT    Room Number: 6832/3602-Z     Session: 1     Number of Visits to Meet Established Goals: 6    Presenting Problem: RLE pain, lymphedema    History related to current admission: Pt admitted from home with RLE after activity, which happens, she says  Management Techniques: Relaxation;Repositioning; Activity promotion    BALANCE                                                                                                                       Static Sitting: Goo below.  Sit <> stand as above, x8 with seated rest between reps. Mod assist to try to help pt scoot back in b/s chair without success. Pt remained on edge. T/f back to bed via total lift for perineal care and brief change.      Activity recommendations established on 6/6/2020 6/9/2020 all goals ongoing. Therapist in surgical mask. Patient in nothing. Therapist accompanied by no one.

## 2020-06-09 NOTE — PROGRESS NOTES
[facility]  Progress Note     Monica Lopez Patient Status:  Observation    1940 MRN [id number]   [facility] 3NE-A Attending Melyssa Grissom MD   Hosp Day # 1 PCP Nicki Gabriel MD     Chief Complaint: Lymphedema    S: Pa ALT 29 30  --   --   --   --    BILT 0.4 0.5  --   --   --   --    TP 6.0* 6.1*  --   --   --   --     < > = values in this interval not displayed. Estimated Creatinine Clearance: 72.4 mL/min (A) (based on SCr of 0.49 mg/dL (L)).     No results for

## 2020-06-09 NOTE — PROGRESS NOTES
Brief Note:    Recent Labs   Lab 06/07/20  0713 06/08/20  0659 06/09/20  0621   GLU 83 85 86   BUN 25* 34* 20*   CREATSERUM 1.26* 2.26* 0.49*   GFRAA 46* 23* 106   GFRNAA 40* 20* 92   CA 8.7 8.5 8.2*    136 138   K 4.1 4.3 3.6    100 105   CO2

## 2020-06-09 NOTE — PROGRESS NOTES
Pharmacy Renal Adjustment Note    Patient's renal function has improved. Creatinine clearance is estimated at 72.4 ml/min. As a result, doses of Lovenox and Reglan have been changed back to dosing that was initially ordered.  Dosing will be resumed at [facility]

## 2020-06-09 NOTE — PLAN OF CARE
A&Ox4. Room air. NSR on tele. VSS. Lomeli in place with adequate output. Mild pain in legs, mainly right groin area. Declined intervention. Daily weight. Strict I&O. PIV- IVF. Mepilex placed on sacrum for mild redness. Repositioned with pillow support.  Legs [place] fall precautions as indicated by assessment.  - Educate pt/family on patient safety including physical limitations  - Instruct pt to call for assistance with activity based on assessment  - Modify environment to reduce risk of injury  - Provide a while performing ADLs such as feeding, grooming, and bathing  - Educate and encourage patient/family in tolerated functional activity level and precautions during self-care     Outcome: Progressing

## 2020-06-10 VITALS
TEMPERATURE: 99 F | DIASTOLIC BLOOD PRESSURE: 41 MMHG | RESPIRATION RATE: 22 BRPM | HEIGHT: 62 IN | BODY MASS INDEX: 30.91 KG/M2 | WEIGHT: 168 LBS | HEART RATE: 85 BPM | SYSTOLIC BLOOD PRESSURE: 100 MMHG | OXYGEN SATURATION: 93 %

## 2020-06-10 PROCEDURE — 99232 SBSQ HOSP IP/OBS MODERATE 35: CPT | Performed by: NURSE PRACTITIONER

## 2020-06-10 PROCEDURE — 99239 HOSP IP/OBS DSCHRG MGMT >30: CPT | Performed by: HOSPITALIST

## 2020-06-10 RX ORDER — ASPIRIN 81 MG/1
81 TABLET ORAL DAILY
Qty: 90 TABLET | Refills: 0 | Status: SHIPPED | OUTPATIENT
Start: 2020-06-11 | End: 2020-09-09

## 2020-06-10 NOTE — PROGRESS NOTES
[facility]  Cardiology Progress Note    Subjective:  No chest pain or shortness of breath. Some left arm pain which she associates with increased activity and \"pulling\" herself up with therapy.      Objective:  /50 (BP Location: Left arm)   Pul gain at the time of admission. Was placed on bumex gtts but developed ROBBIE. Diuretics stopped. · ROBBIE: now resolved w IVF   · MS: limited mobility. Chronic.    · Hypothyroidism: on replacement   · Hx breast cancer    Plan:  · No further diuretics  · Jerry Ken

## 2020-06-10 NOTE — PROGRESS NOTES
[facility] HOSPITALIST  Progress Note     Viv Tanyas Patient Status:  Inpatient    1940 MRN [id number]   [facility] [place] 3NE-A Attending Adrianna Washington MD   Hosp Day # 2 PCP Kathleen Goldberg MD     Chief Complaint: pelvic pain   S: on SCr of 0.49 mg/dL (L)). No results for input(s): PTP, INR in the last 168 hours. Recent Labs   Lab 06/05/20  0913   TROP 0.197*           Imaging: Imaging data reviewed in Epic.   Medications:   • enoxaparin  40 mg Subcutaneous QPM   • aspirin EC

## 2020-06-10 NOTE — CM/SW NOTE
Rn updated MSW that patient is ready for DC. MSW spoke to Admissions Ryan Taylor and she is agreeable to accept patient. MSW spoke to son kayden who is agreeable to dc.     Salvador Cox [phone]  BLS at 3pm

## 2020-06-10 NOTE — PLAN OF CARE
NURSING DISCHARGE NOTE    Discharged Rehab facility via Ambulance. Accompanied by Support staff  Belongings Taken by patient/family. Pt discharged in calm, stable status to [facility] via ambulance. Report called to [facility] @ [phone] ([phone]).  Trans

## 2020-06-10 NOTE — PLAN OF CARE
Resumed pt care at 0730. A&Ox4. Room air. VSS. NSR on tele. Right arm precautions maintained. L AC PIV SL. Cardiac diet, pt tolerating well. Denies N/V. Reports mild generalized pain, addressed with repositioning. Total lift.    Lovenox SQ for Progressing     Problem: SAFETY ADULT - FALL  Goal: Free from fall injury  Description  INTERVENTIONS:  - Assess pt frequently for physical needs  - Identify cognitive and physical deficits and behaviors that affect risk of falls.   - [place] fall precaut Activities of Daily Living  Goal: Achieve highest/safest level of independence in self care  Description  Interventions:  - Assess ability and encourage patient to participate in ADLs to maximize function  - Promote sitting position while performing ADLs s

## 2020-06-11 ENCOUNTER — INITIAL APN SNF VISIT (OUTPATIENT)
Dept: INTERNAL MEDICINE CLINIC | Age: 80
End: 2020-06-11

## 2020-06-11 ENCOUNTER — EXTERNAL FACILITY (OUTPATIENT)
Dept: FAMILY MEDICINE CLINIC | Facility: CLINIC | Age: 80
End: 2020-06-11

## 2020-06-11 DIAGNOSIS — R79.89 LOW SERUM VITAMIN D: ICD-10-CM

## 2020-06-11 DIAGNOSIS — N17.9 AKI (ACUTE KIDNEY INJURY) (HCC): ICD-10-CM

## 2020-06-11 DIAGNOSIS — R33.9 URINARY RETENTION: ICD-10-CM

## 2020-06-11 DIAGNOSIS — G35 MULTIPLE SCLEROSIS (HCC): ICD-10-CM

## 2020-06-11 DIAGNOSIS — Z79.899 MEDICATION MANAGEMENT: ICD-10-CM

## 2020-06-11 DIAGNOSIS — R77.8 ELEVATED TROPONIN: ICD-10-CM

## 2020-06-11 DIAGNOSIS — I89.0 LYMPHEDEMA: ICD-10-CM

## 2020-06-11 DIAGNOSIS — G35 MULTIPLE SCLEROSIS (HCC): Primary | ICD-10-CM

## 2020-06-11 DIAGNOSIS — M79.604 PAIN OF RIGHT LOWER EXTREMITY: Primary | ICD-10-CM

## 2020-06-11 DIAGNOSIS — Z96.0 PRESENCE OF INDWELLING URINARY CATHETER: ICD-10-CM

## 2020-06-11 DIAGNOSIS — I50.9 CONGESTIVE HEART FAILURE, UNSPECIFIED HF CHRONICITY, UNSPECIFIED HEART FAILURE TYPE (HCC): ICD-10-CM

## 2020-06-11 PROCEDURE — 99306 1ST NF CARE HIGH MDM 50: CPT | Performed by: FAMILY MEDICINE

## 2020-06-11 PROCEDURE — 99310 SBSQ NF CARE HIGH MDM 45: CPT | Performed by: NURSE PRACTITIONER

## 2020-06-11 PROCEDURE — 1111F DSCHRG MED/CURRENT MED MERGE: CPT | Performed by: FAMILY MEDICINE

## 2020-06-11 NOTE — DISCHARGE SUMMARY
[facility] HOSPITALIST  DISCHARGE SUMMARY     Dulce Maria Oneal Patient Status:  Inpatient    1940 MRN [id number]   [facility] 3NE-A Attending No att. providers found   Hosp Day # 2 PCP Nyasia Russ MD     Date of Admission: 2020 slipped away she she was getting on toilet because she was wearing socks. Patient states she did not injure her RLE d/t contact with anything but felt like she pulled a muscle. Patient has also noted that she has progressively worsening lower ext swelling. [facility]    Schedule an appointment as soon as possible for a visit in 1 week      --------------------------------------------------------------------------------------  PATIENT DISCHARGE INSTRUCTIONS: See electronic chart

## 2020-06-12 RX ORDER — ERGOCALCIFEROL 1.25 MG/1
50000 CAPSULE ORAL WEEKLY
COMMUNITY

## 2020-06-12 RX ORDER — ACETAMINOPHEN 325 MG/1
650 TABLET ORAL EVERY 6 HOURS PRN
COMMUNITY

## 2020-06-12 NOTE — PROGRESS NOTES
Johanna Garcia Author: Sudeep Ley MD     1940 MRN [id number]   [facility]  Admission 20      Last Hospital Discharge 6/10/20 PCP Sameer Mclaughlin MD   Hospital of Discharge  [facility]        CC --ad[facility] to [facility] from [facility] M.S,  and slight retention problem   • MS (multiple sclerosis) ([facility] [address]     x 21 years    • Muscle weakness    • Neuropathy     right foot from M.s   • Unspecified disorder of thyroid    • Vertigo 6/2016     Past Surgical History:   Procedure Laterality Date 115/50  PHYSICAL EXAM:  GENERAL: well developed, well nourished, in no apparent distress  SKIN: no rashes, no suspicious lesions  Wound; no open wounds  HEENT: atraumatic, normocephalic, ears and throat are clear  EYES: PERRLA, EOMI, conjunctiva are clear failure type [facility]    • - Comprehensive subacute rehab with:  - PT to work on ambulation, gait, balance, strength, endurance, transfers, safety  - OT to work on fine motor skills, ADLs, hygiene, toileting, transfers, safety  - 24h nursing for medication ad

## 2020-06-12 NOTE — PROGRESS NOTES
Anna Tan  : 1940  Age [de-identified]year old  female patient is admitted to Facility: [facility] for [address] [facility] date:  20  Discharge date to [facility]:  6/10/20  ELOS:  13-15 days  Anticipated discharge date:  20  Advanced Directive UA neg, resolved  1. Stopped IVF  2. Lomeli replaced- outpt f/u    3. Lymphedema, doppler negative - patient was on diuretics, but stopped taking them d/t need to urinate and limitations with mobility, BNP normal, PCT neg, ECHO with preserved EF  4.  Elevate Problem Relation Age of Onset   • Cancer Mother 77        Bladder cancer   • Breast Cancer Self 75     Social History    Tobacco Use      Smoking status: Current Every Day Smoker        Packs/day: 0.50      Smokeless tobacco: Never Used    Alcohol use: N catheter  SKIN: ---dry patch to anterior RLE; wound RN to follow  EYES: conjunctiva normal, no drainage from eyes  HENT: normocephalic; normal nose, no nasal drainage, mucous membranes pink, moist  RESPIRATORY:clear to auscultation  CARDIOVASCULAR: S1, S2 records, notes, lab and imaging results reviewed. Medication reconciliation completed.         Assessment and Plan:  Physical Deconditioning/Weakness; hx of falling  PT/OT/ST to evaluate and treat  VIOLET team to establish care plan meeting with patient and P

## 2020-06-17 ENCOUNTER — SNF VISIT (OUTPATIENT)
Dept: INTERNAL MEDICINE CLINIC | Age: 80
End: 2020-06-17

## 2020-06-17 VITALS
SYSTOLIC BLOOD PRESSURE: 109 MMHG | DIASTOLIC BLOOD PRESSURE: 66 MMHG | OXYGEN SATURATION: 95 % | TEMPERATURE: 97 F | HEART RATE: 82 BPM | RESPIRATION RATE: 20 BRPM

## 2020-06-17 DIAGNOSIS — Z74.1 REQUIRES ASSISTANCE WITH ACTIVITIES OF DAILY LIVING (ADL): ICD-10-CM

## 2020-06-17 DIAGNOSIS — G35 MULTIPLE SCLEROSIS (HCC): ICD-10-CM

## 2020-06-17 DIAGNOSIS — Z96.0 PRESENCE OF INDWELLING URINARY CATHETER: ICD-10-CM

## 2020-06-17 DIAGNOSIS — Z87.898 HISTORY OF URINARY RETENTION: ICD-10-CM

## 2020-06-17 DIAGNOSIS — I89.0 LYMPHEDEMA OF BOTH LOWER EXTREMITIES: Primary | ICD-10-CM

## 2020-06-17 PROCEDURE — 99309 SBSQ NF CARE MODERATE MDM 30: CPT | Performed by: NURSE PRACTITIONER

## 2020-06-17 NOTE — PROGRESS NOTES
[address] [facility], 6/23/1940, [de-identified]year old, female    Chief Complaint:  Patient presents with:   Follow - Up  Weakness  [facility] Admit date:  6/5/20  Discharge date to [facility]:  6/10/20  ELOS:  13-15 days  Anticipated discharge date:  6/25/20  Adv soft, nondistended; no visible masses; nontender, no guarding  :saleh to gravity, draining yellow urine  MUSCULOSKELETAL: weakness r/t recent hospitalization/diagnoses/sequelae; will undergo therapies to rehab and improve strength, endurance and independ [id number]  [phone]     Follow-Up with specialists as recommended.     LEANNE Bolivar  6/17/2020  9:45 AM

## 2020-06-19 ENCOUNTER — SNF VISIT (OUTPATIENT)
Dept: INTERNAL MEDICINE CLINIC | Age: 80
End: 2020-06-19

## 2020-06-19 VITALS
SYSTOLIC BLOOD PRESSURE: 111 MMHG | TEMPERATURE: 98 F | HEART RATE: 82 BPM | RESPIRATION RATE: 18 BRPM | OXYGEN SATURATION: 95 % | DIASTOLIC BLOOD PRESSURE: 51 MMHG

## 2020-06-19 DIAGNOSIS — I89.0 LYMPHEDEMA OF BOTH LOWER EXTREMITIES: Primary | ICD-10-CM

## 2020-06-19 DIAGNOSIS — Z87.898 HISTORY OF URINARY RETENTION: ICD-10-CM

## 2020-06-19 DIAGNOSIS — G35 MULTIPLE SCLEROSIS (HCC): ICD-10-CM

## 2020-06-19 DIAGNOSIS — Z96.0 PRESENCE OF INDWELLING URINARY CATHETER: ICD-10-CM

## 2020-06-19 PROCEDURE — 99307 SBSQ NF CARE SF MDM 10: CPT | Performed by: NURSE PRACTITIONER

## 2020-06-19 NOTE — PROGRESS NOTES
[address] [facility], 6/23/1940, [de-identified]year old, female    Chief Complaint:  Patient presents with:   Follow - Up: Lymphedema     [facility] Admit date:  6/5/20  Discharge date to [facility]:  6/10/20  ELOS:  13-15 days  Anticipated discharge date:  6/25/20  Advanced D 2+, no cyanosis, lymphedema+; significant BLEs; R>L  NEUROLOGIC: follows commands  PSYCHIATRIC: alert and oriented x 3; affect appropriate    Medications reviewed: Yes    Diagnostics reviewed:  No new results available for review.     Assessment and plan:

## 2020-06-23 ENCOUNTER — SNF VISIT (OUTPATIENT)
Dept: INTERNAL MEDICINE CLINIC | Age: 80
End: 2020-06-23

## 2020-06-23 DIAGNOSIS — Z74.1 REQUIRES ASSISTANCE WITH ACTIVITIES OF DAILY LIVING (ADL): Primary | ICD-10-CM

## 2020-06-23 DIAGNOSIS — G35 MULTIPLE SCLEROSIS (HCC): ICD-10-CM

## 2020-06-23 DIAGNOSIS — Z96.0 PRESENCE OF INDWELLING URINARY CATHETER: ICD-10-CM

## 2020-06-23 DIAGNOSIS — I89.0 LYMPHEDEMA OF BOTH LOWER EXTREMITIES: ICD-10-CM

## 2020-06-23 PROCEDURE — 99308 SBSQ NF CARE LOW MDM 20: CPT | Performed by: NURSE PRACTITIONER

## 2020-06-25 ENCOUNTER — SNF DISCHARGE (OUTPATIENT)
Dept: INTERNAL MEDICINE CLINIC | Age: 80
End: 2020-06-25

## 2020-06-25 VITALS
SYSTOLIC BLOOD PRESSURE: 149 MMHG | RESPIRATION RATE: 20 BRPM | TEMPERATURE: 98 F | HEART RATE: 79 BPM | DIASTOLIC BLOOD PRESSURE: 65 MMHG | OXYGEN SATURATION: 96 %

## 2020-06-25 VITALS
DIASTOLIC BLOOD PRESSURE: 60 MMHG | TEMPERATURE: 97 F | SYSTOLIC BLOOD PRESSURE: 95 MMHG | OXYGEN SATURATION: 96 % | RESPIRATION RATE: 20 BRPM | HEART RATE: 70 BPM

## 2020-06-25 DIAGNOSIS — I89.0 LYMPHEDEMA OF BOTH LOWER EXTREMITIES: Primary | ICD-10-CM

## 2020-06-25 DIAGNOSIS — G35 MULTIPLE SCLEROSIS (HCC): ICD-10-CM

## 2020-06-25 DIAGNOSIS — Z87.898 HISTORY OF URINARY RETENTION: ICD-10-CM

## 2020-06-25 DIAGNOSIS — R79.89 LOW SERUM VITAMIN D: ICD-10-CM

## 2020-06-25 DIAGNOSIS — Z74.1 REQUIRES ASSISTANCE WITH ACTIVITIES OF DAILY LIVING (ADL): ICD-10-CM

## 2020-06-25 PROCEDURE — 99316 NF DSCHRG MGMT 30 MIN+: CPT | Performed by: NURSE PRACTITIONER

## 2020-06-25 NOTE — PROGRESS NOTES
Johanna Brown, 6/23/1940, 79year old, female    Chief Complaint:  Patient presents with:   Follow - Up  Weakness  Urinary     [facility] Admit date:  6/5/20  Discharge date to [facility]:  6/10/20  ELOS:  13-15 days  Anticipated discharge date:  6/25/20  Adv nondistended; no visible masses; nontender, no guarding  :saelh to gravity, draining yellow urine  MUSCULOSKELETAL: weakness r/t recent hospitalization/diagnoses/sequelae; will continue therapies to rehab and improve strength, endurance and independence Dr Sara Torres [address] [phone]     Follow-Up with specialists as recommended.     LEANNE Mcleod  6/23/2020 10:20 AM

## 2020-06-25 NOTE — PROGRESS NOTES
Johanna Al , 6/23/1940, 79year old, female is being discharged from Facility: [address] [place] [address]    Date of Admission: 6/10/20  Date of Discharge:  6/25/20                            Admitting Diagnoses:  UTI, lymphedema, MS, pain, weaknes catheter d/c'd 6/23--pt refused to have it removed earlier  · Upon discharge, this patient will need home health nursing for disease and medication management, PT/OT/ST to evaluate and treat, CNA/bath aid,  and DME as recommended by rehab ther

## 2021-01-27 DIAGNOSIS — Z23 NEED FOR VACCINATION: ICD-10-CM

## 2022-06-25 ENCOUNTER — APPOINTMENT (OUTPATIENT)
Dept: GENERAL RADIOLOGY | Facility: HOSPITAL | Age: 82
End: 2022-06-25
Attending: EMERGENCY MEDICINE
Payer: MEDICARE

## 2022-06-25 ENCOUNTER — HOSPITAL ENCOUNTER (EMERGENCY)
Facility: HOSPITAL | Age: 82
Discharge: NURSING, REHAB OR LONG-TERM CARE | End: 2022-06-25
Attending: EMERGENCY MEDICINE
Payer: MEDICARE

## 2022-06-25 ENCOUNTER — HOSPITAL ENCOUNTER (EMERGENCY)
Facility: HOSPITAL | Age: 82
Discharge: NURSING, REHAB OR LONG-TERM CARE | End: 2022-06-25
Attending: EMERGENCY MEDICINE
Payer: MEDICARE

## 2022-06-25 VITALS
RESPIRATION RATE: 18 BRPM | SYSTOLIC BLOOD PRESSURE: 139 MMHG | DIASTOLIC BLOOD PRESSURE: 68 MMHG | TEMPERATURE: 98 F | HEART RATE: 75 BPM | HEIGHT: 62 IN | OXYGEN SATURATION: 90 % | WEIGHT: 145 LBS | BODY MASS INDEX: 26.68 KG/M2

## 2022-06-25 DIAGNOSIS — U07.1 COVID-19: ICD-10-CM

## 2022-06-25 DIAGNOSIS — N30.00 ACUTE CYSTITIS WITHOUT HEMATURIA: ICD-10-CM

## 2022-06-25 DIAGNOSIS — S46.911A STRAIN OF RIGHT SHOULDER, INITIAL ENCOUNTER: Primary | ICD-10-CM

## 2022-06-25 LAB
BILIRUB UR QL STRIP.AUTO: NEGATIVE
COLOR UR AUTO: YELLOW
GLUCOSE UR STRIP.AUTO-MCNC: NEGATIVE MG/DL
KETONES UR STRIP.AUTO-MCNC: 20 MG/DL
NITRITE UR QL STRIP.AUTO: POSITIVE
PH UR STRIP.AUTO: 6 [PH] (ref 5–8)
PROT UR STRIP.AUTO-MCNC: NEGATIVE MG/DL
RBC #/AREA URNS AUTO: >10 /HPF
RBC UR QL AUTO: NEGATIVE
SARS-COV-2 RNA RESP QL NAA+PROBE: DETECTED
SP GR UR STRIP.AUTO: 1.01 (ref 1–1.03)
UROBILINOGEN UR STRIP.AUTO-MCNC: <2 MG/DL
WBC #/AREA URNS AUTO: >50 /HPF

## 2022-06-25 PROCEDURE — 87186 SC STD MICRODIL/AGAR DIL: CPT | Performed by: EMERGENCY MEDICINE

## 2022-06-25 PROCEDURE — 87086 URINE CULTURE/COLONY COUNT: CPT | Performed by: EMERGENCY MEDICINE

## 2022-06-25 PROCEDURE — 73030 X-RAY EXAM OF SHOULDER: CPT | Performed by: EMERGENCY MEDICINE

## 2022-06-25 PROCEDURE — 87077 CULTURE AEROBIC IDENTIFY: CPT | Performed by: EMERGENCY MEDICINE

## 2022-06-25 PROCEDURE — 97530 THERAPEUTIC ACTIVITIES: CPT

## 2022-06-25 PROCEDURE — 81001 URINALYSIS AUTO W/SCOPE: CPT | Performed by: EMERGENCY MEDICINE

## 2022-06-25 PROCEDURE — 97162 PT EVAL MOD COMPLEX 30 MIN: CPT

## 2022-06-25 PROCEDURE — 99285 EMERGENCY DEPT VISIT HI MDM: CPT

## 2022-06-25 RX ORDER — AMOXICILLIN 500 MG/1
500 TABLET, FILM COATED ORAL 2 TIMES DAILY
Qty: 14 TABLET | Refills: 0 | Status: SHIPPED | OUTPATIENT
Start: 2022-06-25 | End: 2022-07-02

## 2022-06-25 RX ORDER — ACETAMINOPHEN 500 MG
1000 TABLET ORAL ONCE
Status: COMPLETED | OUTPATIENT
Start: 2022-06-25 | End: 2022-06-25

## 2022-06-25 RX ORDER — AMOXICILLIN 500 MG/1
500 CAPSULE ORAL ONCE
Status: COMPLETED | OUTPATIENT
Start: 2022-06-25 | End: 2022-06-25

## 2022-06-25 NOTE — ED INITIAL ASSESSMENT (HPI)
Pt presents to ed via ems from home c/o lower ext pain after falling on Friday morning at 0400 while attempting to transfer to commode at home with caregiver, pt denies head or neck injury, denies loc

## 2022-06-25 NOTE — ED QUICK NOTES
Son at bedside. Updated on status. Unable to get up. Is in a device similar to an electric w/c at home. Family calling for caregiver to meet patient at home.

## 2022-06-25 NOTE — CM/SW NOTE
Patient accepted at [facility] [address] room and patient will need to bring her own MS medications. PT updated. Son to return to bedside soon. Per patient, she does not take any MS medications. Updated MD and RN. Waiting on number to call RN report and time patient can discharge.

## 2022-06-25 NOTE — ED QUICK NOTES
Son concerned about patient's ability to transfer to and from w/c, as this was an issue at home, which led to the fall. Attempted to assist with staff members x 2 from bed to w/c and w/c to bed. Was unable to tolerate. Dr. Susan Stone notified.

## 2022-06-25 NOTE — CM/SW NOTE
Asked to see patient regarding living arrangements at home. Patient has MS, is wheelchair bound and has had a 24 hour caregiver for the last 2 years. Patient can normally stand and pivot with assistance. When road tested in the ED, patient is unable to stand with assistance. PT eval order placed. PASRR completed and referral placed in [facility]. Per MD, patient is Covid +. Will see if any facilities are accepting Covid + patients.

## 2022-06-27 ENCOUNTER — INITIAL APN SNF VISIT (OUTPATIENT)
Dept: INTERNAL MEDICINE CLINIC | Age: 82
End: 2022-06-27

## 2022-06-27 VITALS — OXYGEN SATURATION: 90 % | HEART RATE: 50 BPM

## 2022-06-27 DIAGNOSIS — R53.81 PHYSICAL DECONDITIONING: ICD-10-CM

## 2022-06-27 DIAGNOSIS — B96.20 E. COLI UTI (URINARY TRACT INFECTION): ICD-10-CM

## 2022-06-27 DIAGNOSIS — B37.0 ORAL CANDIDIASIS: ICD-10-CM

## 2022-06-27 DIAGNOSIS — E03.9 HYPOTHYROIDISM, UNSPECIFIED TYPE: ICD-10-CM

## 2022-06-27 DIAGNOSIS — U07.1 COVID-19: ICD-10-CM

## 2022-06-27 DIAGNOSIS — N39.0 E. COLI UTI (URINARY TRACT INFECTION): ICD-10-CM

## 2022-06-27 DIAGNOSIS — Z78.9 IMPAIRED MOBILITY AND ADLS: ICD-10-CM

## 2022-06-27 DIAGNOSIS — I89.0 LYMPHEDEMA: ICD-10-CM

## 2022-06-27 DIAGNOSIS — S46.911D STRAIN OF RIGHT SHOULDER, SUBSEQUENT ENCOUNTER: ICD-10-CM

## 2022-06-27 DIAGNOSIS — G35 MULTIPLE SCLEROSIS (HCC): ICD-10-CM

## 2022-06-27 DIAGNOSIS — I50.9 CHRONIC CONGESTIVE HEART FAILURE, UNSPECIFIED HEART FAILURE TYPE (HCC): ICD-10-CM
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
## 2022-06-27 DIAGNOSIS — Z74.09 IMPAIRED MOBILITY AND ADLS: ICD-10-CM

## 2022-06-27 RX ORDER — ALBUTEROL SULFATE 90 UG/1
2 AEROSOL, METERED RESPIRATORY (INHALATION) EVERY 4 HOURS PRN
COMMUNITY

## 2022-06-27 RX ORDER — ACETAMINOPHEN 500 MG
1000 TABLET ORAL EVERY 6 HOURS PRN
COMMUNITY

## 2022-06-29 ENCOUNTER — SNF DISCHARGE (OUTPATIENT)
Dept: INTERNAL MEDICINE CLINIC | Age: 82
End: 2022-06-29

## 2022-06-29 VITALS
SYSTOLIC BLOOD PRESSURE: 122 MMHG | RESPIRATION RATE: 18 BRPM | HEART RATE: 85 BPM | OXYGEN SATURATION: 99 % | DIASTOLIC BLOOD PRESSURE: 64 MMHG | TEMPERATURE: 98 F

## 2022-06-29 DIAGNOSIS — B96.20 E. COLI UTI (URINARY TRACT INFECTION): ICD-10-CM

## 2022-06-29 DIAGNOSIS — R53.81 PHYSICAL DECONDITIONING: ICD-10-CM

## 2022-06-29 DIAGNOSIS — S46.911D STRAIN OF RIGHT SHOULDER, SUBSEQUENT ENCOUNTER: ICD-10-CM

## 2022-06-29 DIAGNOSIS — I89.0 LYMPHEDEMA: ICD-10-CM

## 2022-06-29 DIAGNOSIS — G35 MULTIPLE SCLEROSIS (HCC): ICD-10-CM

## 2022-06-29 DIAGNOSIS — Z74.09 IMPAIRED MOBILITY AND ADLS: ICD-10-CM

## 2022-06-29 DIAGNOSIS — U07.1 COVID-19: ICD-10-CM

## 2022-06-29 DIAGNOSIS — I50.9 CHRONIC CONGESTIVE HEART FAILURE, UNSPECIFIED HEART FAILURE TYPE (HCC): ICD-10-CM

## 2022-06-29 DIAGNOSIS — E03.9 HYPOTHYROIDISM, UNSPECIFIED TYPE: ICD-10-CM

## 2022-06-29 DIAGNOSIS — N39.0 E. COLI UTI (URINARY TRACT INFECTION): ICD-10-CM

## 2022-06-29 DIAGNOSIS — J18.9 PNEUMONIA DUE TO INFECTIOUS ORGANISM, UNSPECIFIED LATERALITY, UNSPECIFIED PART OF LUNG: Primary | ICD-10-CM
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
## 2022-06-29 DIAGNOSIS — Z78.9 IMPAIRED MOBILITY AND ADLS: ICD-10-CM

## 2022-06-29 DIAGNOSIS — B37.0 ORAL CANDIDIASIS: ICD-10-CM

## 2022-06-29 PROCEDURE — 99316 NF DSCHRG MGMT 30 MIN+: CPT | Performed by: NURSE PRACTITIONER

## 2022-06-29 RX ORDER — NIRMATRELVIR AND RITONAVIR 300-100 MG
KIT ORAL 2 TIMES DAILY
COMMUNITY
Start: 2022-06-28 | End: 2022-07-02

## 2022-06-29 RX ORDER — NICOTINE 14MG/24HR
250 PATCH, TRANSDERMAL 24 HOURS TRANSDERMAL 2 TIMES DAILY
COMMUNITY
Start: 2022-06-29 | End: 2022-07-10

## 2022-06-29 RX ORDER — AZITHROMYCIN 250 MG/1
250 TABLET, FILM COATED ORAL DAILY
COMMUNITY
Start: 2022-06-29 | End: 2022-07-03

## 2022-06-30 ENCOUNTER — PATIENT OUTREACH (OUTPATIENT)
Dept: CASE MANAGEMENT | Age: 82
End: 2022-06-30

## 2022-06-30 DIAGNOSIS — Z02.9 ENCOUNTERS FOR UNSPECIFIED ADMINISTRATIVE PURPOSE: ICD-10-CM

## 2022-06-30 DIAGNOSIS — S46.911A STRAIN OF RIGHT SHOULDER, INITIAL ENCOUNTER: ICD-10-CM

## 2022-06-30 PROCEDURE — 1111F DSCHRG MED/CURRENT MED MERGE: CPT

## 2022-11-01 ENCOUNTER — LAB REQUISITION (OUTPATIENT)
Dept: LAB | Facility: HOSPITAL | Age: 82
End: 2022-11-01
Payer: MEDICARE

## 2022-11-01 DIAGNOSIS — G35 MULTIPLE SCLEROSIS (HCC): ICD-10-CM

## 2022-11-01 LAB
T4 FREE SERPL-MCNC: 1.2 NG/DL (ref 0.8–1.7)
TSI SER-ACNC: 5.68 MIU/ML (ref 0.36–3.74)

## 2022-11-01 PROCEDURE — 84439 ASSAY OF FREE THYROXINE: CPT | Performed by: FAMILY MEDICINE

## 2022-11-01 PROCEDURE — 84443 ASSAY THYROID STIM HORMONE: CPT | Performed by: FAMILY MEDICINE

## 2023-05-04 ENCOUNTER — APPOINTMENT (OUTPATIENT)
Dept: GENERAL RADIOLOGY | Facility: HOSPITAL | Age: 83
End: 2023-05-04
Attending: EMERGENCY MEDICINE
Payer: MEDICARE

## 2023-05-04 ENCOUNTER — HOSPITAL ENCOUNTER (EMERGENCY)
Facility: HOSPITAL | Age: 83
Discharge: HOME | End: 2023-05-04
Attending: EMERGENCY MEDICINE
Payer: MEDICARE

## 2023-05-04 ENCOUNTER — HOSPITAL ENCOUNTER (EMERGENCY)
Facility: HOSPITAL | Age: 83
Discharge: NURSING, REHAB OR LONG-TERM CARE | End: 2023-05-04
Attending: EMERGENCY MEDICINE
Payer: MEDICARE

## 2023-05-04 VITALS
WEIGHT: 145.06 LBS | BODY MASS INDEX: 26.69 KG/M2 | HEART RATE: 81 BPM | DIASTOLIC BLOOD PRESSURE: 57 MMHG | TEMPERATURE: 98 F | SYSTOLIC BLOOD PRESSURE: 133 MMHG | HEIGHT: 62 IN | OXYGEN SATURATION: 96 % | RESPIRATION RATE: 16 BRPM

## 2023-05-04 DIAGNOSIS — I89.0 LYMPHEDEMA DUE TO CHRONIC INFLAMMATION: ICD-10-CM

## 2023-05-04 DIAGNOSIS — R26.2 INABILITY TO WALK: Primary | ICD-10-CM

## 2023-05-04 DIAGNOSIS — G35 MULTIPLE SCLEROSIS (HCC): ICD-10-CM

## 2023-05-04 LAB
ALBUMIN SERPL-MCNC: 3 G/DL (ref 3.4–5)
ALBUMIN/GLOB SERPL: 0.9 {RATIO} (ref 1–2)
ALP LIVER SERPL-CCNC: 172 U/L
ALT SERPL-CCNC: 26 U/L
ANION GAP SERPL CALC-SCNC: 6 MMOL/L (ref 0–18)
AST SERPL-CCNC: 30 U/L (ref 15–37)
BASOPHILS # BLD AUTO: 0.02 X10(3) UL (ref 0–0.2)
BASOPHILS NFR BLD AUTO: 0.3 %
BILIRUB SERPL-MCNC: 0.5 MG/DL (ref 0.1–2)
BUN BLD-MCNC: 27 MG/DL (ref 7–18)
CALCIUM BLD-MCNC: 9.3 MG/DL (ref 8.5–10.1)
CHLORIDE SERPL-SCNC: 111 MMOL/L (ref 98–112)
CO2 SERPL-SCNC: 26 MMOL/L (ref 21–32)
CREAT BLD-MCNC: 0.47 MG/DL
EOSINOPHIL # BLD AUTO: 0.07 X10(3) UL (ref 0–0.7)
EOSINOPHIL NFR BLD AUTO: 1.2 %
ERYTHROCYTE [DISTWIDTH] IN BLOOD BY AUTOMATED COUNT: 16.3 %
GFR SERPLBLD BASED ON 1.73 SQ M-ARVRAT: 94 ML/MIN/1.73M2 (ref 60–?)
GLOBULIN PLAS-MCNC: 3.5 G/DL (ref 2.8–4.4)
GLUCOSE BLD-MCNC: 91 MG/DL (ref 70–99)
HCT VFR BLD AUTO: 37.1 %
HGB BLD-MCNC: 12.1 G/DL
IMM GRANULOCYTES # BLD AUTO: 0.02 X10(3) UL (ref 0–1)
IMM GRANULOCYTES NFR BLD: 0.3 %
LYMPHOCYTES # BLD AUTO: 0.87 X10(3) UL (ref 1–4)
LYMPHOCYTES NFR BLD AUTO: 15.1 %
MCH RBC QN AUTO: 29.3 PG (ref 26–34)
MCHC RBC AUTO-ENTMCNC: 32.6 G/DL (ref 31–37)
MCV RBC AUTO: 89.8 FL
MONOCYTES # BLD AUTO: 0.65 X10(3) UL (ref 0.1–1)
MONOCYTES NFR BLD AUTO: 11.3 %
NEUTROPHILS # BLD AUTO: 4.12 X10 (3) UL (ref 1.5–7.7)
NEUTROPHILS # BLD AUTO: 4.12 X10(3) UL (ref 1.5–7.7)
NEUTROPHILS NFR BLD AUTO: 71.8 %
OSMOLALITY SERPL CALC.SUM OF ELEC: 301 MOSM/KG (ref 275–295)
PLATELET # BLD AUTO: 143 10(3)UL (ref 150–450)
POTASSIUM SERPL-SCNC: 4.3 MMOL/L (ref 3.5–5.1)
PROT SERPL-MCNC: 6.5 G/DL (ref 6.4–8.2)
RBC # BLD AUTO: 4.13 X10(6)UL
SARS-COV-2 RNA RESP QL NAA+PROBE: NOT DETECTED
SODIUM SERPL-SCNC: 143 MMOL/L (ref 136–145)
WBC # BLD AUTO: 5.8 X10(3) UL (ref 4–11)

## 2023-05-04 PROCEDURE — 99285 EMERGENCY DEPT VISIT HI MDM: CPT

## 2023-05-04 PROCEDURE — 97530 THERAPEUTIC ACTIVITIES: CPT

## 2023-05-04 PROCEDURE — 85025 COMPLETE CBC W/AUTO DIFF WBC: CPT | Performed by: EMERGENCY MEDICINE

## 2023-05-04 PROCEDURE — 73630 X-RAY EXAM OF FOOT: CPT | Performed by: EMERGENCY MEDICINE

## 2023-05-04 PROCEDURE — 73610 X-RAY EXAM OF ANKLE: CPT | Performed by: EMERGENCY MEDICINE

## 2023-05-04 PROCEDURE — 80053 COMPREHEN METABOLIC PANEL: CPT | Performed by: EMERGENCY MEDICINE

## 2023-05-04 PROCEDURE — 97162 PT EVAL MOD COMPLEX 30 MIN: CPT

## 2023-05-04 PROCEDURE — 36415 COLL VENOUS BLD VENIPUNCTURE: CPT

## 2023-05-04 NOTE — CM/SW NOTE
Asked to speak with son regarding home care. Patient currently has 24 hour caregivers. She has been wheelchair bound for 2 years. She is able to stand and pivot with the help of her caregivers. Patient recently fell during transfer and is now having difficulty standing. Patient has MS. PT order placed to see if patient has a skillable need or is pain the reason patient can't stand. PT's recommendations will determine plan of care. Per son, they do have financial resources available if some out of pocket are needed.   Updated RN and MD.

## 2023-05-04 NOTE — CM/SW NOTE
Patient has been accepted at [facility]. [facility] can accept patient today. RN to call report to [phone]  Ambulance can be arranged to pick patient up 4:30-4:45. RN updated.

## 2023-05-04 NOTE — ED QUICK NOTES
Pt going to [facility], report given to [employer].

## 2023-05-04 NOTE — ED QUICK NOTES
Pt's son approached us stated pt needs help and if possible to go to short term rehab after this visit. Piper  notified.

## 2023-05-04 NOTE — ED INITIAL ASSESSMENT (HPI)
Pt here came from home, per patient she was in her wheelchair and her caregiver was helping her transfer from the chair to the bed and she fell hurting her right ankle/foot. State this happened 2 days ago.  Was not able to bear weight on the affected leg

## 2023-05-05 ENCOUNTER — EXTERNAL FACILITY (OUTPATIENT)
Dept: FAMILY MEDICINE CLINIC | Facility: CLINIC | Age: 83
End: 2023-05-05

## 2023-05-05 DIAGNOSIS — I50.9 CHRONIC CONGESTIVE HEART FAILURE, UNSPECIFIED HEART FAILURE TYPE (HCC): ICD-10-CM

## 2023-05-05 DIAGNOSIS — R33.9 URINARY RETENTION: ICD-10-CM

## 2023-05-05 DIAGNOSIS — I89.0 LYMPHEDEMA: ICD-10-CM

## 2023-05-05 DIAGNOSIS — M79.604 PAIN OF RIGHT LOWER EXTREMITY: Primary | ICD-10-CM

## 2023-05-05 DIAGNOSIS — E03.9 HYPOTHYROIDISM, UNSPECIFIED TYPE: ICD-10-CM

## 2023-05-05 DIAGNOSIS — R26.9 GAIT DIFFICULTY: ICD-10-CM

## 2023-05-05 DIAGNOSIS — G35 MULTIPLE SCLEROSIS (HCC): ICD-10-CM

## 2023-05-09 ENCOUNTER — INITIAL APN SNF VISIT (OUTPATIENT)
Dept: INTERNAL MEDICINE CLINIC | Age: 83
End: 2023-05-09

## 2023-05-09 DIAGNOSIS — Z78.9 IMPAIRED MOBILITY AND ADLS: ICD-10-CM

## 2023-05-09 DIAGNOSIS — E03.9 HYPOTHYROIDISM, UNSPECIFIED TYPE: ICD-10-CM

## 2023-05-09 DIAGNOSIS — Z91.89 AT RISK FOR IMPAIRMENT OF SWALLOWING: ICD-10-CM

## 2023-05-09 DIAGNOSIS — G35 MS (MULTIPLE SCLEROSIS) (HCC): ICD-10-CM

## 2023-05-09 DIAGNOSIS — Z74.09 IMPAIRED MOBILITY AND ADLS: ICD-10-CM

## 2023-05-09 DIAGNOSIS — E55.9 VITAMIN D DEFICIENCY: ICD-10-CM

## 2023-05-09 DIAGNOSIS — R53.81 PHYSICAL DECONDITIONING: ICD-10-CM

## 2023-05-09 DIAGNOSIS — I89.0 LYMPHEDEMA: ICD-10-CM

## 2023-05-09 DIAGNOSIS — R33.9 URINE RETENTION: ICD-10-CM
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
## 2023-05-09 DIAGNOSIS — M25.471 ANKLE SWELLING, RIGHT: ICD-10-CM

## 2023-05-09 DIAGNOSIS — J45.909 MILD ASTHMA WITHOUT COMPLICATION, UNSPECIFIED WHETHER PERSISTENT: ICD-10-CM

## 2023-05-09 PROCEDURE — 99310 SBSQ NF CARE HIGH MDM 45: CPT | Performed by: NURSE PRACTITIONER

## 2023-05-10 VITALS
RESPIRATION RATE: 20 BRPM | SYSTOLIC BLOOD PRESSURE: 135 MMHG | TEMPERATURE: 97 F | OXYGEN SATURATION: 94 % | HEART RATE: 65 BPM | DIASTOLIC BLOOD PRESSURE: 78 MMHG

## 2023-05-10 RX ORDER — ERGOCALCIFEROL 1.25 MG/1
50000 CAPSULE ORAL WEEKLY
COMMUNITY
Start: 2023-05-12

## 2023-05-10 RX ORDER — ACETAMINOPHEN 500 MG
500 TABLET ORAL EVERY 6 HOURS PRN
COMMUNITY

## 2023-05-12 ENCOUNTER — SNF VISIT (OUTPATIENT)
Dept: INTERNAL MEDICINE CLINIC | Age: 83
End: 2023-05-12

## 2023-05-12 VITALS
TEMPERATURE: 97 F | RESPIRATION RATE: 20 BRPM | OXYGEN SATURATION: 94 % | SYSTOLIC BLOOD PRESSURE: 149 MMHG | HEART RATE: 87 BPM | DIASTOLIC BLOOD PRESSURE: 67 MMHG

## 2023-05-12 DIAGNOSIS — I89.0 LYMPHEDEMA: ICD-10-CM

## 2023-05-12 DIAGNOSIS — R51.9 SINUS HEADACHE: Primary | ICD-10-CM

## 2023-05-12 DIAGNOSIS — Z74.09 IMPAIRED MOBILITY AND ADLS: ICD-10-CM

## 2023-05-12 DIAGNOSIS — Z78.9 IMPAIRED MOBILITY AND ADLS: ICD-10-CM
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
## 2023-05-12 DIAGNOSIS — M25.471 ANKLE SWELLING, RIGHT: ICD-10-CM

## 2023-05-12 PROCEDURE — 99308 SBSQ NF CARE LOW MDM 20: CPT | Performed by: NURSE PRACTITIONER

## 2023-05-12 RX ORDER — GUAIFENESIN 600 MG/1
600 TABLET, EXTENDED RELEASE ORAL 2 TIMES DAILY PRN
COMMUNITY

## 2023-05-15 ENCOUNTER — SNF DISCHARGE (OUTPATIENT)
Dept: INTERNAL MEDICINE CLINIC | Age: 83
End: 2023-05-15

## 2023-05-15 VITALS
DIASTOLIC BLOOD PRESSURE: 85 MMHG | TEMPERATURE: 97 F | OXYGEN SATURATION: 98 % | RESPIRATION RATE: 18 BRPM | SYSTOLIC BLOOD PRESSURE: 134 MMHG | HEART RATE: 73 BPM

## 2023-05-15 DIAGNOSIS — G35 MS (MULTIPLE SCLEROSIS) (HCC): ICD-10-CM

## 2023-05-15 DIAGNOSIS — R53.81 PHYSICAL DECONDITIONING: ICD-10-CM

## 2023-05-15 DIAGNOSIS — Z78.9 IMPAIRED MOBILITY AND ADLS: ICD-10-CM
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
## 2023-05-15 DIAGNOSIS — R05.2 SUBACUTE COUGH: Primary | ICD-10-CM

## 2023-05-15 DIAGNOSIS — R93.89 INFILTRATE NOTED ON IMAGING STUDY: ICD-10-CM

## 2023-05-15 DIAGNOSIS — R09.89 RUNNY NOSE: ICD-10-CM

## 2023-05-15 DIAGNOSIS — R51.9 SINUS HEADACHE: ICD-10-CM

## 2023-05-15 DIAGNOSIS — Z74.09 IMPAIRED MOBILITY AND ADLS: ICD-10-CM

## 2023-05-15 PROBLEM — R05.9 COUGH IN ADULT: Status: ACTIVE | Noted: 2023-05-15

## 2023-05-15 RX ORDER — CEFDINIR 300 MG/1
300 CAPSULE ORAL 2 TIMES DAILY
COMMUNITY
Start: 2023-05-15 | End: 2023-05-20

## 2023-05-17 ENCOUNTER — MED REC SCAN ONLY (OUTPATIENT)
Dept: FAMILY MEDICINE CLINIC | Facility: CLINIC | Age: 83
End: 2023-05-17

## 2024-01-29 PROCEDURE — 93306 TTE W/DOPPLER COMPLETE: CPT | Performed by: INTERNAL MEDICINE

## 2024-01-31 ENCOUNTER — EXTERNAL FACILITY (OUTPATIENT)
Dept: FAMILY MEDICINE CLINIC | Facility: CLINIC | Age: 84
End: 2024-01-31

## 2024-01-31 DIAGNOSIS — N39.0 URINARY TRACT INFECTION WITHOUT HEMATURIA, SITE UNSPECIFIED: ICD-10-CM

## 2024-01-31 DIAGNOSIS — R26.9 GAIT DIFFICULTY: ICD-10-CM

## 2024-01-31 DIAGNOSIS — R53.1 GENERALIZED WEAKNESS: Primary | ICD-10-CM

## 2024-01-31 DIAGNOSIS — I50.9 CHRONIC CONGESTIVE HEART FAILURE, UNSPECIFIED HEART FAILURE TYPE (HCC): ICD-10-CM

## 2024-01-31 DIAGNOSIS — E03.9 HYPOTHYROIDISM, UNSPECIFIED TYPE: ICD-10-CM

## 2024-01-31 DIAGNOSIS — G35 MULTIPLE SCLEROSIS (HCC): ICD-10-CM

## 2024-01-31 PROCEDURE — G0317 PROLNG NSG FAC E/M EA ADDL 15 MIN: HCPCS | Performed by: FAMILY MEDICINE

## 2024-01-31 PROCEDURE — 99306 1ST NF CARE HIGH MDM 50: CPT | Performed by: FAMILY MEDICINE

## 2024-01-31 NOTE — PROGRESS NOTES
Johanna Wolf Author: Shubham Amador MD     1940 MRN [id number]   Last Hospital  Admission 2024     Last Hospital Discharge 2024 PCP Kaitlin Lo MD   Hospital of Discharge [facility]        CC --admitted to [facility] from [facility] for subacute rehab, frequent falls and lower extremity weakness    H.P.I Johanna Wolf is a 83 year old female resident of independent living has past medical history significant for multiple sclerosis asthma hypothyroidism vitamin D deficiency and chronic bilateral lower extremity pedal edema  Patient was taken to [facility] after she had repeated falls, workup was negative for any fractures but she was found to have weakness on the lower extremity, she was also found to have urinary tract infection that was treated with antibiotics transition to oral cefdinir  Evaluated by PT/OT and subacute rehab was recommended  Patient was transferred in stable state to [facility]    Past Medical History:   Diagnosis Date    Back problem     occassional lower back spasms & pain- walks with walker    Breast CA ([facility] 2014    [facility]    Cancer ([facility])     breast right breast    Hypothyroidism     Incontinence     slight incontinence from M.S,  and slight retention problem    MS (multiple sclerosis) ([facility])     x 21 years     Muscle weakness     Neuropathy     right foot from .s    Unspecified disorder of thyroid     Vertigo 2016     Past Surgical History:   Procedure Laterality Date    LAPAROSCOPY PROCEDURE UNLISTED  1969    LUMPECTOMY RIGHT Right 2014    [facility]    NEEDLE BIOPSY RIGHT Right 2014    US guided biopsy - [facility]    OTHER      detached retina repair right eye    OTHER SURGICAL HISTORY  2014    Right breast lumpectomy    RADIATION RIGHT  2014    REPAIR RETINAL DETACH, CPLX  2013    right eye    TONSILLECTOMY       Family History   Problem Relation Age of Onset    Cancer Mother 66        Bladder cancer    Breast Cancer Self 75     Social History      Socioeconomic History    Marital status:     Number of children: 1   Occupational History    Occupation: RETIRED   Tobacco Use    Smoking status: Every Day     Packs/day: .5     Types: Cigarettes    Smokeless tobacco: Never   Substance and Sexual Activity    Alcohol use: No    Drug use: No       ALLERGIES:  No Known Allergies    CODE STATUS:  Full Code    CURRENT MEDICATIONS   Current Outpatient Medications   Medication Sig Dispense Refill    guaiFENesin ER (MUCINEX) 600 MG Oral Tablet 12 Hr Take 1 tablet (600 mg total) by mouth 2 (two) times daily as needed for congestion.      acetaminophen (TYLENOL EXTRA STRENGTH) 500 MG Oral Tab Take 1 tablet (500 mg total) by mouth every 6 (six) hours as needed for Pain.      ergocalciferol 1.25 MG (10231 UT) Oral Cap Take 1 capsule (50,000 Units total) by mouth once a week.      albuterol 108 (90 Base) MCG/ACT Inhalation Aero Soln Inhale 2 puffs into the lungs every 4 (four) hours as needed for Wheezing.      Magnesium Hydroxide (MILK OF MAGNESIA CONCENTRATE OR) Take 30 mL by mouth daily as needed.      Levothyroxine Sodium 75 MCG Oral Tab Take 1 tablet (75 mcg total) by mouth before breakfast.         REVIEW OF SYSTEMS:  Review of Systems:   Constitutional: No fevers, chills, fatigue or night sweats.  ENT: No mouth pain, neck pain, running nose, headaches or swollen glands.  Skin: No rashes, pruritus or skin changes,  Respiratory: Denies cough, wheezing or shortness of breath.  CV: Denies chest pain, palpitations, orthopnea, PND or dizziness.  Musculoskeletal: No joint pain, stiffness or swelling.  GI: No nausea, vomiting or diarrhea. No blood in stools.  Neurologic: No seizures, tremors, weakness or numbness    VITALS: Pulse 72/min respiration 18/min temperature 97.3 blood pressure 130/80    PHYSICAL EXAM:  GENERAL: well developed, well nourished, in no apparent distress  SKIN: no rashes, no suspicious lesions  Wound; superficial wounds on the lower extremities  under dressing  HEENT: atraumatic, normocephalic, ears and throat are clear  EYES: PERRLA, EOMI, conjunctiva are clear  NECK: supple, no adenopathy, no bruits  CHEST: no chest tenderness  LUNGS: clear to auscultation  CARDIO: RRR without murmur  GI: good BS's, no masses, HSM or tenderness  : deferred  RECTAL: deferred  MUSCULOSKELETAL: back is not tender, FROM of the back  EXTREMITIES bilateral lower extremity lymphedema  NEURO: Oriented times three, cranial nerves are intact, motor and sensory are grossly intact    ASSESSMENT AND PLAN:      Diagnoses and all orders for this visit:    Generalized weakness  Gait difficulty  - PT to work on ambulation, gait, balance, strength, endurance, transfers, safety  - OT to work on fine motor skills, ADLs, hygiene, toileting, transfers, safety  Urinary tract infection without hematuria, site unspecified  On oral cefdinir  Chronic congestive heart failure, unspecified heart failure type (HCC)  Daily weights  2 L fluid restriction  Hypothyroidism, unspecified type  Continue levothyroxine  Check TSH  Multiple sclerosis (HCC)  Not on any medications for last 10 years  Continue range of motion exercises  - 24h nursing for medication administration, bowel/bladder care, pain/sleep assessment  - Physician supervision for multiple medical comorbidities, fall risk, DVT risk, infection risk, pain management  - Psych for adjustment to disability and cognitive deficits  - Social work/: for discharge planning needs and access to community resources as needed     -Hospital medications reviewed reconciled and restarted   Check the labs in the morning, CBC CMP TSH, vitamin D, UA    Time spent at appointment today is 95 minutes including preparing to see patient, reviewing test results, performing medically appropriate examination and evaluation and coordinating care, counseling and educating patient/family, ordering medications and testing, and documenting clinical information in  EMR.       Shubham Amador MD   [facility]  [address]  [facility] [address]    Electronically signed      This dictation was performed with a verbal recognition program (DRAGON) and it was checked for errors. It is possible that there are still dictated errors within this office note. If so, please bring any errors to my attention for an addendum. All efforts were made to ensure that this office note is accurate

## 2024-02-02 ENCOUNTER — INITIAL APN SNF VISIT (OUTPATIENT)
Dept: INTERNAL MEDICINE CLINIC | Age: 84
End: 2024-02-02

## 2024-02-02 VITALS
SYSTOLIC BLOOD PRESSURE: 112 MMHG | TEMPERATURE: 97 F | BODY MASS INDEX: 33 KG/M2 | OXYGEN SATURATION: 94 % | HEART RATE: 62 BPM | RESPIRATION RATE: 20 BRPM | WEIGHT: 178 LBS | DIASTOLIC BLOOD PRESSURE: 61 MMHG

## 2024-02-02 DIAGNOSIS — Z78.9 IMPAIRED MOBILITY AND ADLS: ICD-10-CM

## 2024-02-02 DIAGNOSIS — Z74.09 IMPAIRED MOBILITY AND ADLS: ICD-10-CM

## 2024-02-02 DIAGNOSIS — E03.9 HYPOTHYROIDISM, UNSPECIFIED TYPE: ICD-10-CM

## 2024-02-02 DIAGNOSIS — G35 MS (MULTIPLE SCLEROSIS) (HCC): ICD-10-CM
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
## 2024-02-02 DIAGNOSIS — N39.0 URINARY TRACT INFECTION WITHOUT HEMATURIA, SITE UNSPECIFIED: Primary | ICD-10-CM

## 2024-02-02 DIAGNOSIS — E55.9 VITAMIN D DEFICIENCY: ICD-10-CM

## 2024-02-02 DIAGNOSIS — I89.0 LYMPHEDEMA: ICD-10-CM

## 2024-02-02 PROCEDURE — 99310 SBSQ NF CARE HIGH MDM 45: CPT | Performed by: NURSE PRACTITIONER

## 2024-02-02 RX ORDER — PREDNISOLONE ACETATE 10 MG/ML
4 SUSPENSION/ DROPS OPHTHALMIC AS NEEDED
COMMUNITY

## 2024-02-02 RX ORDER — ERGOCALCIFEROL 1.25 MG/1
50000 CAPSULE ORAL WEEKLY
COMMUNITY

## 2024-02-02 RX ORDER — MULTIPLE VITAMINS W/ MINERALS TAB 9MG-400MCG
1 TAB ORAL DAILY
COMMUNITY

## 2024-02-02 RX ORDER — BISACODYL 5 MG/1
5 TABLET, DELAYED RELEASE ORAL
COMMUNITY

## 2024-02-02 RX ORDER — ACETAMINOPHEN 325 MG/1
650 TABLET ORAL EVERY 6 HOURS PRN
COMMUNITY

## 2024-02-02 RX ORDER — PREDNISOLONE ACETATE 10 MG/ML
2 SUSPENSION/ DROPS OPHTHALMIC AS NEEDED
COMMUNITY

## 2024-02-02 NOTE — PROGRESS NOTES
Skilled Nursing Facility, Subacute Rehab  [facility]    Johanna Wolf Author: LEANNE Chang     1940 MRN [id number]   Last Hospital  Admission 23      Last Hospital Discharge 23 PCP Kaitlin Lo MD     Hospital Discharge Diagnoses:  -UTI  -MS  -Lymphedema  -Vitamin D Deficiency  -Hypothyroidism  -Eye pain    HPI:  Johanna Wolf  : 1940, Age 83 year old  female patient with PMH sig for MS, incontinence presented to the ER with frequent falls and lower extremity weakness.  Workup was done and the patient was found to have a UTI.  Treated with abx IV and transitioned to oral cefdinir.  She was discharged in stable condition to [facility] for rehabilitation and medical management.    Chief Complaint   Patient presents with    Follow - Up     UTI; H/o MS and Lymphedema      ALLERGIES    She has No Known Allergies.      CURRENT MEDS:    Current Outpatient Medications on File Prior to Visit   Medication Sig    acetaminophen 325 MG Oral Tab Take 2 tablets (650 mg total) by mouth every 6 (six) hours as needed for Pain.    multivitamin with minerals Oral Tab Take 1 tablet by mouth daily.    prednisoLONE 1 % Ophthalmic Suspension Place 2 drops into the left eye as needed.    prednisoLONE 1 % Ophthalmic Suspension Place 4 drops into the right eye as needed.    bisacodyl 5 MG Oral Tab EC Take 1 tablet (5 mg total) by mouth daily as needed for constipation.    ergocalciferol 1.25 MG (37865 UT) Oral Cap Take 1 capsule (50,000 Units total) by mouth once a week.    acetaminophen (TYLENOL EXTRA STRENGTH) 500 MG Oral Tab Take 1 tablet (500 mg total) by mouth at bedtime.    Levothyroxine Sodium 75 MCG Oral Tab Take 100 mcg by mouth before breakfast.     No current facility-administered medications on file prior to visit.         HISTORY:    She  has a past medical history of Back problem, Breast CA ([facility]) (2014), Cancer ([facility]), Hypothyroidism, Incontinence, MS (multiple sclerosis) ([facility]), Muscle  weakness, Neuropathy, Unspecified disorder of thyroid, and Vertigo (6/2016).    She  has a past surgical history that includes tonsillectomy; laparoscopy procedure unlisted (1969); repair retinal detach, cplx (04/2013); other; other surgical history (9/2014); lumpectomy right (Right, 9/2014); radiation right (9/2014); and needle biopsy right (Right, 8/2014).      CODE STATUS VERIFIED: Full Code    SUBJECTIVE:    REVIEW OF SYSTEMS:  GENERAL HEALTH:feels well otherwise  SKIN: denies any unusual skin lesions or rashes  WOUNDS: B/L le   EYES:no visual complaints or deficits  HENT: denies nasal congestion, sinus pain or sore throat; and hearing loss negative  RESPIRATORY: denies shortness of breath, wheezing or cough   CARDIOVASCULAR:denies chest pain, no palpitations , denies syncope, denies orthopnea, denies cough  GI: denies nausea, vomiting, constipation, diarrhea; no rectal bleeding; no heartburn  :frequency and urgency  MUSCULOSKELETAL:---+Weakness in B/L le  NEURO:no sensory or motor complaint, denies seizures, denies vertigo, denies tinnitus, and denies tremors  PSYCHE: no symptoms of depression or anxiety  HEMATOLOGY:denies hx anemia, denies bruising, denies excessive bleeding  ENDOCRINE: denies excessive thirst or urination; denies unexpected wt gain or wt loss  ALLERGY/IMM.: denies food or seasonal allergies      OBJECTIVE:  VITALS:  /61   Pulse 62   Temp 96.6 °F (35.9 °C)   Resp 20   Wt 178 lb (80.7 kg)   SpO2 94%   BMI 32.56 kg/m²     PHYSICAL EXAM:  GENERAL HEALTH: well developed, well nourished, in no apparent distress  LINES, TUBES, DRAINS:  none  SKIN: pale, warm, dry  WOUND: +B/L le wounds  EYES: PERRLA, conjunctiva normal; no drainage from eyes  HENT: normocephalic; normal nose, no nasal drainage, mucous membranes pink, moist  NECK: full range of motion observed  RESPIRATORY:clear to percussion and auscultation, No wheezing/cough/accessory muscle use; on room air  CARDIOVASCULAR: S1, S2  normal, RRR; no S3, no S4; , no click, no murmur  ABDOMEN: normal active BS+, soft, non-distended; no apparent masses; observed, non-tender, no guarding during physical exam  : Deferred  LYMPHATIC: ---+Lymphedema in B/L LE   MUSCULOSKELETAL: ---+Weakness in B/L le.  Weakness R/T recent hospitalization/diagnoses/sequelae; will undergo therapies to rehab and improve strength, endurance and independence w/ ADLs as able  EXTREMITIES/VASCULAR: radial pulses 2+, dorsalis pedal pulses 2+, and ---+3 edema in B/L le  NEUROLOGIC: intact; no sensorimotor deficit, reflexes normal, cranial nerves intact II-XII, follows commands  PSYCHIATRIC: alert and oriented x 3; affect appropriate    DIAGNOSTICS REVIEWED AT THIS VISIT:  Vital signs reviewed in [facility] EMR.  [facility] medical records, notes, lab and imaging results reviewed. And diagnostics available in rehab records/[facility].  Medication reconciliation completed.      CBC W/DIFF dated: 1/31/24  WBC 4.7 10'3/uL 3.5-10.5 Final  RBC 3.94 10'6/uL (Based on  documented  legal sex) 3.80-  5.20  Final  HGB 11.6 g/dL (Based on  documented  legal sex) 11.6-  15.4  Final  HCT 34.9 % (Based on  documented  legal sex) 34.0-  45.0  Final  MCV 88.6 fL 80.0-99.0 Final  MCH 29.4 pg 27.0-34.0 Final  MCHC 33.2 g/dL 32.0-35.5 Final  RDW 17.6 % 11.0-15.0 H Final   10'3/uL 150-400 L Final  MPV 12.9 fL 8.8-12.1 H Final  NRBC's 0.0 % 0.0 Final  Absolute NRBCs 0.0 10'3/uL 0.0 Final  Neutrophils 64.9 % 34.0-73.0 Final  Lymphocytes 19.7 % 15.0-50.0 Final  Monocytes 10.8 % 1.0-15.0 Final  Eosinophils 2.1 % 0.0-8.0 Final  Basophils 0.8 % 0.0-2.0 Final  Immature Granulocytes 1.7 % No defined  reference range  Final  Absolute Neutrophils 3.1 10'3/uL 1.5-8.0 Final  Absolute Lymphocytes 0.9 10'3/uL 1.0-4.0 L Final  Absolute Monocytes 0.5 10'3/uL 0.2-1.0 Final  Absolute Eosinophils 0.1 10'3/uL 0.0-0.6 Final  Absolute Basophils 0.0 10'3/uL 0.0-0.3 Final  Absolute Immature Granulocytes 0.1  10'3/uL 0.00-0.10 Final    CMP:  1/31/24  Sodium 141 mmol/L 133-146 Final  Potassium 3.9 mmol/L 3.5-5.1 Final  Chloride 105 mmol/L  Final  Carbon Dioxide 30 mmol/L 21-31 Final  Anion Gap 6 mmol/L 4-13 Final  Blood Urea Nitrogen 16 mg/dL 7-25 Final  Creatinine 0.44 mg/dL 0.60-1.30 L Final  eGFRcr (CKD-EPI 2021) >90 mL/min/1.73  m2  >=60 Final  Calcium 8.4 mg/dL 8.3-10.5 Final  Glucose 79 mg/dL  Final  Protein, Total 5.5 g/dL 6.4-8.3 L Final  Albumin 2.9 g/dL 3.5-5.0 L Final  ALT 17 units/L 9-43 Final  Alkaline Phosphatase 195 units/L  H Final  AST 21 units/L 13-39 Final  Bilirubin, Total 0.3 mg/dL 0.2-1.2 Final    Vitamin D, 25-Hydroxy, Total 8.7 ng/mL 30.0-100.0 L Final    SEE PLAN BELOW  MS/Physical Deconditioning/Impaired mobility and ADLs/At risk for falling  -Fall Precautions  -PT/OT/ST to evaluate and treat  -Tylenol 500 mg at bedtime  -Tylenol 650 mg q6h prn for fever/pain, if given for fever, notify MD/NP  -[facility] team to establish care plan meeting with patient and POA/family as appropriate  -Anticipate DC on or before TBD; SW to assist patient/family w/ DC planning  -DC Plan:  Home Alone, Has Caregivers     UTI  -Cefdinir 500 mg bid-->stop 2/2/24    MS  -No Treatment    Lymphedema  -Wounds on B/L e  -Wound care team following  -Treatment per wound care  -Ace Wraps every day    Vitamin D Deficiency  -Vitamin D level 8.7  -Ergocalciferol 92075 units q weekly X 10 weeks  -Repeat Vitamin D level in 10 weeks    Hypothyroidism  -Levothyroxine 100 mcq every day    Eye pain  -Prednisolone Suspension 2gtt in left eye for pain prn  -Prednisolone Suspension 4gtt in right eye for pain prn    DVT Prophylaxis   -Encourage early mobilization and participation in PT/OT as able    Bowel Management Regimen/Constipation   -Senna S 1 tablet every day prn  -Bisacodyl 5mg supp every day prn     Supplements  -MVI every day    LABS  -CBC/CMP weekly while in [facility]    Follow Up Appointments  -Dr. Madrigal  Wally,PCP within 1-2 weeks following [facility] discharge.     *Greater than 65 minutes spent w/ patient and family, reviewing medical records, labs, completing medication reconciliation and entering orders to establish plan of care in [facility].    Marshall Fallon, LEANNE  02/02/24   2:45 PM

## 2024-02-05 ENCOUNTER — SNF VISIT (OUTPATIENT)
Dept: INTERNAL MEDICINE CLINIC | Age: 84
End: 2024-02-05

## 2024-02-05 VITALS
RESPIRATION RATE: 20 BRPM | OXYGEN SATURATION: 93 % | HEART RATE: 73 BPM | DIASTOLIC BLOOD PRESSURE: 63 MMHG | TEMPERATURE: 98 F | SYSTOLIC BLOOD PRESSURE: 124 MMHG

## 2024-02-05 DIAGNOSIS — G35 MS (MULTIPLE SCLEROSIS) (HCC): ICD-10-CM
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
## 2024-02-05 DIAGNOSIS — I89.0 LYMPHEDEMA: ICD-10-CM

## 2024-02-05 DIAGNOSIS — E55.9 VITAMIN D DEFICIENCY: ICD-10-CM

## 2024-02-05 NOTE — PROGRESS NOTES
Johanna Wolf, 1940, 83 year old, female    Chief Complaint:    Chief Complaint   Patient presents with    Follow - Up     Weakness in b/l le, MS, UTI        Subjective:  Johanna Wolf  : 1940, Age 83 year old  female patient with PMH sig for MS, incontinence presented to the ER with frequent falls and lower extremity weakness.  Workup was done and the patient was found to have a UTI.  Treated with abx IV and transitioned to oral cefdinir.  She was discharged in stable condition to [facility] for rehabilitation and medical management.     Today:  Seen in room after therapy.  Doing well with no complaints at present.  Completed the Cefdinir for the UTI.    Denies insomnia, fatigue, fever/chills, cough, SOB, dyspnea, angina, palpitations, n/v, diarrhea, constipation, and urinary sxs.    Objective:  /63   Pulse 73   Temp 98.1 °F (36.7 °C)   Resp 20   SpO2 93%   PHYSICAL EXAM:  GENERAL HEALTH: well developed, well nourished, in no apparent distress  LINES, TUBES, DRAINS:  none  SKIN: pale, warm, dry  WOUND: +B/L le wounds  EYES: PERRLA, conjunctiva normal; no drainage from eyes  HENT: normocephalic; normal nose, no nasal drainage, mucous membranes pink, moist  NECK: full range of motion observed  RESPIRATORY:clear to percussion and auscultation, No wheezing/cough/accessory muscle use; on room air  CARDIOVASCULAR: S1, S2 normal, RRR; no S3, no S4; , no click, no murmur  ABDOMEN: normal active BS+, soft, non-distended; no apparent masses; observed, non-tender, no guarding during physical exam  : Deferred  LYMPHATIC: ---+Lymphedema in B/L LE   MUSCULOSKELETAL: ---+Weakness in B/L le.  Weakness R/T recent hospitalization/diagnoses/sequelae; will undergo therapies to rehab and improve strength, endurance and independence w/ ADLs as able  EXTREMITIES/VASCULAR: radial pulses 2+, dorsalis pedal pulses 2+, and ---+3 edema in B/L le-->d/t lymphedema  NEUROLOGIC: intact; no sensorimotor deficit, reflexes normal,  cranial nerves intact II-XII, follows commands  PSYCHIATRIC: alert and oriented x 3; affect appropriate    Medications reviewed: Yes    Diagnostics reviewed:  none on this visit    Assessment and plan:  MS/Physical Deconditioning/Impaired mobility and ADLs/At risk for falling  -Fall Precautions  -PT/OT/ST to evaluate and treat  -Tylenol 500 mg at bedtime  -Tylenol 650 mg q6h prn for fever/pain, if given for fever, notify MD/NP  -[facility] team to establish care plan meeting with patient and POA/family as appropriate  -Anticipate DC on or before TBD; SW to assist patient/family w/ DC planning  -DC Plan:  Home Alone, Has Caregivers     UTI  -Cefdinir-->completed     MS  -No Treatment     Lymphedema  -Wounds on B/L e  -Wound care team following  -Treatment per wound care  -Ace Wraps every day     Vitamin D Deficiency  -Vitamin D level 8.7  -Ergocalciferol 43691 units q weekly X 10 weeks  -Repeat Vitamin D level in 10 weeks     Hypothyroidism  -Levothyroxine 100 mcq every day     Eye pain  -Prednisolone Suspension 2gtt in left eye for pain prn  -Prednisolone Suspension 4gtt in right eye for pain prn     DVT Prophylaxis   -Encourage early mobilization and participation in PT/OT as able     Bowel Management Regimen/Constipation   -Senna S 1 tablet every day prn  -Bisacodyl 5mg supp every day prn     Supplements  -MVI every day     LABS  -CBC/CMP weekly while in [facility]    Follow Up Appointments  -Dr. Kaitlin Lo,PCP within 1-2 weeks following [facility] discharge.     Marshall Fallon, APRN  2/5/2024  3:15 PM

## 2024-02-08 ENCOUNTER — SNF DISCHARGE (OUTPATIENT)
Dept: INTERNAL MEDICINE CLINIC | Age: 84
End: 2024-02-08

## 2024-02-08 DIAGNOSIS — Z74.09 IMPAIRED MOBILITY AND ADLS: ICD-10-CM

## 2024-02-08 DIAGNOSIS — I89.0 LYMPHEDEMA: ICD-10-CM

## 2024-02-08 DIAGNOSIS — Z78.9 IMPAIRED MOBILITY AND ADLS: ICD-10-CM

## 2024-02-08 DIAGNOSIS — G35 MS (MULTIPLE SCLEROSIS) (HCC): ICD-10-CM

## 2024-02-08 DIAGNOSIS — N39.0 URINARY TRACT INFECTION WITHOUT HEMATURIA, SITE UNSPECIFIED: ICD-10-CM
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
## 2024-02-08 PROCEDURE — 99316 NF DSCHRG MGMT 30 MIN+: CPT | Performed by: NURSE PRACTITIONER

## 2024-02-09 VITALS
HEART RATE: 82 BPM | OXYGEN SATURATION: 97 % | SYSTOLIC BLOOD PRESSURE: 98 MMHG | DIASTOLIC BLOOD PRESSURE: 71 MMHG | RESPIRATION RATE: 20 BRPM | TEMPERATURE: 97 F

## 2024-02-10 NOTE — PROGRESS NOTES
Johanna Wolf, 1940, 83 year old, female is being discharged from Facility: [facility]      DISCHARGE SUMMARY    Date of Admission:24    Date of Discharge: 2/10/24                           Admitting Diagnoses:UTI    Subjective: Johanna Wolf  : 1940, Age 83 year old  female patient with PMH sig for MS, incontinence presented to the ER with frequent falls and lower extremity weakness.  Workup was done and the patient was found to have a UTI.  Treated with abx IV and transitioned to oral cefdinir.  She was discharged in stable condition to [facility] for rehabilitation and medical management.      Vital signs:  BP 98/71   Pulse 82   Temp 97 °F (36.1 °C)   Resp 20   SpO2 97%     ROS and Physical Exam:        REVIEW OF SYSTEMS:  GENERAL HEALTH:feels well otherwise  SKIN: denies any unusual skin lesions or rashes  WOUNDS: B/L le   EYES:no visual complaints or deficits  HENT: denies nasal congestion, sinus pain or sore throat; and hearing loss negative  RESPIRATORY: denies shortness of breath, wheezing or cough   CARDIOVASCULAR:denies chest pain, no palpitations , denies syncope, denies orthopnea, denies cough  GI: denies nausea, vomiting, constipation, diarrhea; no rectal bleeding; no heartburn  :frequency and urgency  MUSCULOSKELETAL:---+Weakness in B/L le  NEURO:no sensory or motor complaint, denies seizures, denies vertigo, denies tinnitus, and denies tremors  PSYCHE: no symptoms of depression or anxiety  HEMATOLOGY:denies hx anemia, denies bruising, denies excessive bleeding  ENDOCRINE: denies excessive thirst or urination; denies unexpected wt gain or wt loss  ALLERGY/IMM.: denies food or seasonal allergies    PHYSICAL EXAM:  GENERAL HEALTH: well developed, well nourished, in no apparent distress  LINES, TUBES, DRAINS:  none  SKIN: pale, warm, dry  WOUND: +B/L le wounds  EYES: PERRLA, conjunctiva normal; no drainage from eyes  HENT: normocephalic; normal nose, no nasal drainage, mucous membranes  pink, moist  NECK: full range of motion observed  RESPIRATORY:clear to percussion and auscultation, No wheezing/cough/accessory muscle use; on room air  CARDIOVASCULAR: S1, S2 normal, RRR; no S3, no S4; , no click, no murmur  ABDOMEN: normal active BS+, soft, non-distended; no apparent masses; observed, non-tender, no guarding during physical exam  : Deferred  LYMPHATIC: ---+Lymphedema in B/L LE   MUSCULOSKELETAL: ---+Weakness in B/L le.  Weakness R/T recent hospitalization/diagnoses/sequelae; will undergo therapies to rehab and improve strength, endurance and independence w/ ADLs as able  EXTREMITIES/VASCULAR: radial pulses 2+, dorsalis pedal pulses 2+, and ---+3 edema in B/L le  NEUROLOGIC: intact; no sensorimotor deficit, reflexes normal, cranial nerves intact II-XII, follows commands  PSYCHIATRIC: alert and oriented x 3; affect appropriate    Therapy update:2/6/24  Bed mobility--Max/Mod  Transfers--Max X 2  Ambulation--Unable  UE ADLs--Min A  LE ADLs--Max A  Toileting--Max A  Bathing--Max a  DC Plan: Home with Full Time CG    Skilled Nursing Facility Course:    Progressing well with PT/OT.  Medically cleared for anticipated discharge  Home with home care services RN/PT/OT/ST/Bath Aide  Equipment per PT recommendations: Has all of her equipment at home    Most recent lab results:  CBC W/DIFF: dated 2/7/24  WBC 5.3 10'3/uL 3.5-10.5 Final  RBC 3.82 10'6/uL (Based on  documented  legal sex) 3.80-  5.20  Final  HGB 11.2 g/dL (Based on  documented  legal sex) 11.6-  15.4  L Final  HCT 34.3 % (Based on  documented  legal sex) 34.0-  45.0  Final  MCV 89.8 fL 80.0-99.0 Final  MCH 29.3 pg 27.0-34.0 Final  MCHC 32.7 g/dL 32.0-35.5 Final  RDW 17.7 % 11.0-15.0 H Final   10'3/uL 150-400 Final  MPV 11.5 fL 8.8-12.1 Final  NRBC's 0.0 % 0.0 Final  Absolute NRBCs 0.0 10'3/uL 0.0 Final  Neutrophils 57.9 % 34.0-73.0 Final  Lymphocytes 28.4 % 15.0-50.0 Final  Monocytes 10.2 % 1.0-15.0 Final  Eosinophils 2.1 % 0.0-8.0  Final  Basophils 0.6 % 0.0-2.0 Final  Immature Granulocytes 0.8 % No defined  reference range  Final  Absolute Neutrophils 3.1 10'3/uL 1.5-8.0 Final  Absolute Lymphocytes 1.5 10'3/uL 1.0-4.0 Final  Absolute Monocytes 0.5 10'3/uL 0.2-1.0 Final  Absolute Eosinophils 0.1 10'3/uL 0.0-0.6 Final  Absolute Basophils 0.0 10'3/uL 0.0-0.3 Final  Absolute Immature Granulocytes 0.0 10'3/uL 0.00-0.10 Final    CMP:  2/7/24  Sodium 142 mmol/L 133-146 Final  Potassium 4.0 mmol/L 3.5-5.1 Final  Chloride 107 mmol/L  Final  Carbon Dioxide 29 mmol/L 21-31 Final  Anion Gap 6 mmol/L 4-13 Final  Blood Urea Nitrogen 23 mg/dL 7-25 Final  Creatinine 0.39 mg/dL 0.60-1.30 L Final  eGFRcr (CKD-EPI 2021) >90 mL/min/1.73  m2  >=60 Final  Calcium 8.3 mg/dL 8.3-10.5 Final  Glucose 82 mg/dL  Final  Protein, Total 5.3 g/dL 6.4-8.3 L Final  Albumin 3.0 g/dL 3.5-5.0 L Final  ALT 12 units/L 9-43 Final  Alkaline Phosphatase 171 units/L  H Final  AST 17 units/L 13-39 Final  Bilirubin, Total 0.5 mg/dL 0.2-1.2 Final    Discharge Diagnoses w/ current management:  MS/Physical Deconditioning/Impaired mobility and ADLs/At risk for falling  -Fall Precautions  -PT/OT/ST to evaluate and treat  -Tylenol 500 mg at bedtime  -Tylenol 650 mg q6h prn for fever/pain, if given for fever, notify MD/NP  -[facility] team to establish care plan meeting with patient and POA/family as appropriate  -Anticipate DC on or before 2/9/24; SW to assist patient/family w/ DC planning  -DC Plan:  Home Alone, Has Caregivers     UTI  -Cefdinir-->Completed     MS  -No Treatment     Lymphedema  -Wounds on B/L e  -Ace Wraps every day     Vitamin D Deficiency  -Vitamin D level 8.7  -Ergocalciferol 20159 units q weekly X 10 weeks  -Repeat Vitamin D level in 10 weeks     Hypothyroidism  -Levothyroxine 100 mcq every day     Eye pain  -Prednisolone Suspension 2gtt in left eye for pain prn  -Prednisolone Suspension 4gtt in right eye for pain prn     DVT Prophylaxis   -Encourage early  mobilization and participation in PT/OT as able     Bowel Management Regimen/Constipation   -Senna S 1 tablet every day prn  -Bisacodyl 5mg supp every day prn     Supplements  -MVI every day     LABS  -Per PCP     Follow Up Appointments  -Dr. Kaitlin Lo,PCP within 1-2 weeks following [facility] discharge.     Medication Reconciliation Completed:  Yes    *Greater than 30 minutes spent in coordination of care in preparation for discharge.    Marshall Fallon, LEANNE  2/8/2024  8:07 PM

## 2024-03-04 ENCOUNTER — MED REC SCAN ONLY (OUTPATIENT)
Dept: FAMILY MEDICINE CLINIC | Facility: CLINIC | Age: 84
End: 2024-03-04

## 2024-04-15 PROCEDURE — 93306 TTE W/DOPPLER COMPLETE: CPT | Performed by: INTERNAL MEDICINE

## 2024-04-16 ENCOUNTER — EXTERNAL FACILITY (OUTPATIENT)
Dept: FAMILY MEDICINE CLINIC | Facility: CLINIC | Age: 84
End: 2024-04-16

## 2024-04-16 DIAGNOSIS — M79.604 PAIN OF RIGHT LOWER EXTREMITY: ICD-10-CM

## 2024-04-16 DIAGNOSIS — G35 MULTIPLE SCLEROSIS (HCC): ICD-10-CM

## 2024-04-16 DIAGNOSIS — R53.1 GENERALIZED WEAKNESS: Primary | ICD-10-CM

## 2024-04-16 DIAGNOSIS — I89.0 LYMPHEDEMA: ICD-10-CM

## 2024-04-16 DIAGNOSIS — J18.9 PNEUMONIA DUE TO INFECTIOUS ORGANISM, UNSPECIFIED LATERALITY, UNSPECIFIED PART OF LUNG: ICD-10-CM

## 2024-04-16 DIAGNOSIS — N39.0 URINARY TRACT INFECTION WITHOUT HEMATURIA, SITE UNSPECIFIED: ICD-10-CM

## 2024-04-16 DIAGNOSIS — I50.9 CHRONIC CONGESTIVE HEART FAILURE, UNSPECIFIED HEART FAILURE TYPE (HCC): ICD-10-CM

## 2024-04-16 DIAGNOSIS — R26.9 GAIT DIFFICULTY: ICD-10-CM

## 2024-04-16 PROCEDURE — 99306 1ST NF CARE HIGH MDM 50: CPT | Performed by: FAMILY MEDICINE

## 2024-04-16 PROCEDURE — G0317 PROLNG NSG FAC E/M EA ADDL 15 MIN: HCPCS | Performed by: FAMILY MEDICINE

## 2024-04-17 NOTE — PROGRESS NOTES
Johanna Wolf Author: Shubham Amador MD     1940 MRN [id number]   Last Hospital  Admission /24     Last Hospital Discharge /4/15/24 PCP Kaitlin Lo MD   Hospital of Discharge [facility] --admitted to [facility] from [facility] subacute rehab, generalized weakness,    H.P.I Johanna Wolf is a 83 year old female with past medical history of sclerosis chronic dizziness, hypertension bilateral lower extremity lymphedema who was brought into the emergency room from home, after she got progressively weak, workup revealed patient was in acute edema as well as was found to have UTI  Patient was treated with IV diuretics transition to oral Bumex  She finished the course of Zithromax while she was in the hospital  Patient was evaluated by PT/OT and subacute rehab was recommended  Transferred to [facility] on 4/15/2024    Discharge diagnosis  Acute pulmonary edema without CHF  Multiple sclerosis with progressive worsening of the weakness  Benign positional vertigo  Hypertension  Chronic bilateral lower extremity lymphedema  UTI  History of breast cancer    Patient seen in the room with her son  She has some strength back  She feels weak and is unable to move even on her bed  Denies any pain in her back but on lower extremities is excruciating pain her pain medications are adequate        Past Medical History:    Back problem    occassional lower back spasms & pain- walks with walker    Breast CA (HCC)    [facility]    Cancer (HCC)    breast right breast    Hypothyroidism    Incontinence    slight incontinence from M.S,  and slight retention problem    MS (multiple sclerosis) (HCC)    x 21 years     Muscle weakness    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    Unspecified disorder of thyroid    Vertigo     Past Surgical History:   Procedure Laterality Date    Laparoscopy procedure unlisted      Lumpectomy right Right 2014    [facility]    Needle biopsy right Right 2014    US guided biopsy - [facility]     Other      detached retina repair right eye    Other surgical history  9/2014    Right breast lumpectomy    Radiation right  9/2014    Repair retinal detach, cplx  04/2013    right eye    Tonsillectomy       Family History   Problem Relation Age of Onset    Cancer Mother 66        Bladder cancer    Breast Cancer Self 74     Social History     Socioeconomic History    Marital status:     Number of children: 1   Occupational History    Occupation: RETIRED   Tobacco Use    Smoking status: Every Day     Current packs/day: 0.50     Types: Cigarettes    Smokeless tobacco: Never   Substance and Sexual Activity    Alcohol use: No    Drug use: No       ALLERGIES:  No Known Allergies    CODE STATUS:  Full Code    CURRENT MEDICATIONS   Current Outpatient Medications   Medication Sig Dispense Refill    acetaminophen 325 MG Oral Tab Take 2 tablets (650 mg total) by mouth every 6 (six) hours as needed for Pain.      multivitamin with minerals Oral Tab Take 1 tablet by mouth daily.      prednisoLONE 1 % Ophthalmic Suspension Place 2 drops into the left eye as needed.      prednisoLONE 1 % Ophthalmic Suspension Place 4 drops into the right eye as needed.      bisacodyl 5 MG Oral Tab EC Take 1 tablet (5 mg total) by mouth daily as needed for constipation.      ergocalciferol 1.25 MG (40050 UT) Oral Cap Take 1 capsule (50,000 Units total) by mouth once a week.      acetaminophen (TYLENOL EXTRA STRENGTH) 500 MG Oral Tab Take 1 tablet (500 mg total) by mouth at bedtime.      Levothyroxine Sodium 75 MCG Oral Tab Take 100 mcg by mouth before breakfast.         REVIEW OF SYSTEMS:  Review of Systems:   Constitutional: No fevers, chills, fatigue or night sweats.  ENT: No mouth pain, neck pain, running nose, headaches or swollen glands.  Skin: No rashes, pruritus or skin changes,  Respiratory: Denies cough, wheezing or shortness of breath.  CV: Denies chest pain, palpitations, orthopnea, PND or dizziness.  Musculoskeletal: No joint  pain, stiffness or swelling.  GI: No nausea, vomiting or diarrhea. No blood in stools.  Neurologic: No seizures, tremors, weakness or numbness    VITALS: Pulse 82/min respiration 18/min temperature 98.7 blood pressure 100/70    PHYSICAL EXAM:  GENERAL: well developed, well nourished, in no apparent distress  SKIN: Bilateral lower extremity lymphedema  Wound; no open wounds  HEENT: atraumatic, normocephalic,  EYES: PERRLA, EOMI, conjunctiva are clear  NECK: supple, no adenopathy, no bruits  CHEST: no chest tenderness  LUNGS: clear to auscultation  CARDIO: RRR without murmur  GI: good BS's, no masses, HSM or tenderness  : deferred  RECTAL: deferred  MUSCULOSKELETAL: back is not tender, FROM of the back  EXTREMITIES: Bilateral lower extremity lymphedema  NEURO: Oriented times three, cranial nerves are intact, motor and sensory are grossly intact  6  ASSESSMENT AND PLAN:      Diagnoses and all orders for this visit:    Generalized weakness  Gait difficulty  - PT to work on ambulation, gait, balance, strength, endurance, transfers, safety  - OT to work on fine motor skills, ADLs, hygiene, toileting, transfers, safet  Urinary tract infection without hematuria, site unspecified  Pneumonia due to infectious organism, unspecified laterality, unspecified part of lung  On Zithromax for 4 days  Chronic congestive heart failure, unspecified heart failure type (HCC)  Daily weights  Bumex 1 mg daily  Multiple sclerosis (HCC)  Not on any medications  Lymphedema  Zinc oxide to lower extremities topically  May need wraps on her legs  Pain of right lower extremity  Tylenol 650 mg every 6 hours  - 24h nursing for medication administration, bowel/bladder care, pain/sleep assessment  - Physician supervision for multiple medical comorbidities, fall risk, DVT risk, infection risk, pain management  - Psych for adjustment to disability and cognitive deficits  - Social work/: for discharge planning needs and access to community  resources as needed     -Hospital medications reviewed reconciled and restarted   Check the labs in the morning, CBC CMP TSH, vitamin D, UA    Time spent at appointment today is 95 minutes including preparing to see patient, reviewing test results, performing medically appropriate examination and evaluation and coordinating care, counseling and educating patient/family, ordering medications and testing, and documenting clinical information in EMR.       Shubham Amador MD   [facility]  [address] [address]    Electronically signed      This dictation was performed with a verbal recognition program (DRAGON) and it was checked for errors. It is possible that there are still dictated errors within this office note. If so, please bring any errors to my attention for an addendum. All efforts were made to ensure that this office note is accurate

## 2024-04-19 ENCOUNTER — INITIAL APN SNF VISIT (OUTPATIENT)
Dept: INTERNAL MEDICINE CLINIC | Age: 84
End: 2024-04-19

## 2024-04-19 DIAGNOSIS — Z78.9 IMPAIRED MOBILITY AND ADLS: ICD-10-CM

## 2024-04-19 DIAGNOSIS — S31.819D OPEN WOUND OF RIGHT BUTTOCK, SUBSEQUENT ENCOUNTER: ICD-10-CM

## 2024-04-19 DIAGNOSIS — G35 MS (MULTIPLE SCLEROSIS) (HCC): ICD-10-CM

## 2024-04-19 DIAGNOSIS — E55.9 VITAMIN D DEFICIENCY: ICD-10-CM

## 2024-04-19 DIAGNOSIS — J81.0 ACUTE PULMONARY EDEMA (HCC): ICD-10-CM

## 2024-04-19 DIAGNOSIS — J18.9 COMMUNITY ACQUIRED PNEUMONIA, UNSPECIFIED LATERALITY: ICD-10-CM

## 2024-04-19 DIAGNOSIS — Z74.09 IMPAIRED MOBILITY AND ADLS: ICD-10-CM

## 2024-04-19 DIAGNOSIS — E03.9 HYPOTHYROIDISM, UNSPECIFIED TYPE: ICD-10-CM

## 2024-04-19 DIAGNOSIS — I89.0 LYMPHEDEMA: ICD-10-CM

## 2024-04-19 DIAGNOSIS — N39.0 URINARY TRACT INFECTION WITHOUT HEMATURIA, SITE UNSPECIFIED: ICD-10-CM

## 2024-04-19 DIAGNOSIS — R53.1 WEAKNESS: Primary | ICD-10-CM

## 2024-04-19 DIAGNOSIS — R42 DIZZINESS: ICD-10-CM

## 2024-04-19 PROCEDURE — 99310 SBSQ NF CARE HIGH MDM 45: CPT | Performed by: NURSE PRACTITIONER

## 2024-04-20 VITALS
RESPIRATION RATE: 20 BRPM | DIASTOLIC BLOOD PRESSURE: 75 MMHG | OXYGEN SATURATION: 95 % | WEIGHT: 162.81 LBS | HEART RATE: 71 BPM | TEMPERATURE: 97 F | BODY MASS INDEX: 30 KG/M2 | SYSTOLIC BLOOD PRESSURE: 133 MMHG

## 2024-04-20 RX ORDER — AZITHROMYCIN 250 MG/1
250 TABLET, FILM COATED ORAL DAILY
COMMUNITY
Start: 2024-04-15 | End: 2024-04-20

## 2024-04-20 RX ORDER — MECLIZINE HCL 12.5 MG/1
12.5 TABLET ORAL 2 TIMES DAILY
COMMUNITY

## 2024-04-20 RX ORDER — BUMETANIDE 1 MG/1
1 TABLET ORAL DAILY
COMMUNITY

## 2024-04-20 NOTE — PROGRESS NOTES
Skilled Nursing Facility, Subacute Rehab  [facility]    Johanna Wolf Author: LEANNE Chang     1940 MRN [id number]   Last Hospital  Admission 23      Last Hospital Discharge 23 PCP Kaitlin Lo MD     Hospital Discharge Diagnoses:  -Weakness  -Chronic Dizziness  -MS with Progressive Worsening  -Bilateral lower extremity edema d/t lymphedema  -Hypothyroidism  -Acute Respiratory Failure with Hypoxia d/t Pulmonary Edema  -Remote H/o breast cancer  -UTI  -CAP  -Right Buttock open areas    HPI:  Johanna Wolf  : 1940, Age 83 year old  female patient with PMHx significant for MS, chronic dizziness, HTN, and bilateral lower extremity lymphedema who was brought into the emergency room from home, after she got progressively weak.  Workup revealed patient was in acute pulmonary edema as well as was found to have UTI.  Given IV Rocephin in the hospital as well as IV diuretics transition to oral Bumex.  Started on oral Zithromax for Pulmonary Edema as well as CAP to be completed on .  She will continue on the Bumex as well.  Patient was evaluated by PT/OT and subacute rehab was recommended.  Transferred to [facility] on 4/15/2024 for rehabilitation and medical management.     Today:  Seen in room.  Assessed the patient while she sat up in electric W/C.  Lungs diminished, no shortness of breath noted.  B/L le with lymphedema.  Ordered Lovenox.  Declined by son, who is POA.  Patient to wear acewraps while sitting up in wheelchair.  Ordered Lovenox for DVT prophylaxis.  Declined by son.  Feels that she's been unable to walk for a long time and never had any blood clots.  Will continue to monitor.    Chief Complaint   Patient presents with    Follow - Up     Weakness, MS, Hypothyroidism, Acute Resp failure with hypoxia d/t pulmonary edema        ALLERGIES    She has No Known Allergies.      CURRENT MEDS:    Current Outpatient Medications on File Prior to Visit   Medication Sig     bumetanide 1 MG Oral Tab Take 1 tablet (1 mg total) by mouth daily.    azithromycin 250 MG Oral Tab Take 1 tablet (250 mg total) by mouth daily.    triamcinolone 0.1% Apply 1 g topically once.    meclizine 12.5 MG Oral Tab Take 1 tablet (12.5 mg total) by mouth in the morning and 1 tablet (12.5 mg total) before bedtime.    acetaminophen 325 MG Oral Tab Take 2 tablets (650 mg total) by mouth every 6 (six) hours as needed for Pain.    multivitamin with minerals Oral Tab Take 1 tablet by mouth daily.    prednisoLONE 1 % Ophthalmic Suspension Place 2 drops into the left eye as needed.    prednisoLONE 1 % Ophthalmic Suspension Place 4 drops into the right eye as needed.    bisacodyl 5 MG Oral Tab EC Take 1 tablet (5 mg total) by mouth daily as needed for constipation.    ergocalciferol 1.25 MG (04256 UT) Oral Cap Take 1 capsule (50,000 Units total) by mouth once a week.    Levothyroxine Sodium 75 MCG Oral Tab Take 100 mcg by mouth before breakfast.     No current facility-administered medications on file prior to visit.         HISTORY:    She  has a past medical history of Back problem, Breast CA (HCC) (9/2014), Cancer (HCC), Hypothyroidism, Incontinence, MS (multiple sclerosis) (HCC), Muscle weakness, Neuropathy, Unspecified disorder of thyroid, and Vertigo (6/2016).    She  has a past surgical history that includes tonsillectomy; laparoscopy procedure unlisted (1969); repair retinal detach, cplx (04/2013); other; other surgical history (9/2014); lumpectomy right (Right, 9/2014); radiation right (9/2014); and needle biopsy right (Right, 8/2014).      CODE STATUS VERIFIED: Full Code    SUBJECTIVE:    REVIEW OF SYSTEMS:  GENERAL HEALTH:feels well otherwise  SKIN: denies any unusual skin lesions or rashes  WOUNDS: +Rt inner buttock open areas  EYES:no visual complaints or deficits  HENT: denies nasal congestion, sinus pain or sore throat; and hearing loss negative  RESPIRATORY: denies shortness of breath, wheezing or  cough   CARDIOVASCULAR:denies chest pain, no palpitations , denies syncope, denies orthopnea, denies cough  GI: denies nausea, vomiting, constipation, diarrhea; no rectal bleeding; no heartburn  :no dysuria, urgency or frequency; no vaginal discharge; no urinary incontinence; no hematuria  MUSCULOSKELETAL:no joint complaints upper or lower extremities  NEURO:no sensory or motor complaint, denies seizures, denies vertigo, denies tinnitus, and denies tremors  PSYCHE: no symptoms of depression or anxiety  HEMATOLOGY:denies hx anemia, denies bruising, denies excessive bleeding  ENDOCRINE: denies excessive thirst or urination; denies unexpected wt gain or wt loss  ALLERGY/IMM.: denies food or seasonal allergies      OBJECTIVE:  VITALS:  /75   Pulse 71   Temp 96.9 °F (36.1 °C)   Resp 20   Wt 162 lb 12.8 oz (73.8 kg)   SpO2 95%   BMI 29.78 kg/m²     PHYSICAL EXAM:  GENERAL HEALTH: well developed, well nourished, in no apparent distress  LINES, TUBES, DRAINS:  none  SKIN: pale, warm, dry  WOUND: +Rt inner buttock open areas, per wound care team  EYES: PERRLA, conjunctiva normal; no drainage from eyes  HENT: normocephalic; normal nose, no nasal drainage, mucous membranes pink, moist  NECK: full range of motion observed  RESPIRATORY:---+Diminished bilaterally; No wheezing/cough/accessory muscle use; on room air  CARDIOVASCULAR: S1, S2 normal, RRR; no S3, no S4; , no click, no murmur  ABDOMEN: normal active BS+, soft, non-distended; no apparent masses; observed, non-tender, no guarding during physical exam  : Deferred  LYMPHATIC: ---+Bilateral Lymphedema  MUSCULOSKELETAL: no acute synovitis upper or lower extremity.  Weakness R/T recent hospitalization/diagnoses/sequelae; will undergo therapies to rehab and improve strength, endurance and independence w/ ADLs as able  EXTREMITIES/VASCULAR: ---+B/L le lymphedema; discolored; no cyanosis, no clubbing  NEUROLOGIC: intact; no sensorimotor deficit, cranial nerves  intact II-XII, follows commands, ---unable to move lower extremities w/o assistance  PSYCHIATRIC: alert and oriented x 3; affect appropriate-->very sweet and pleasant    DIAGNOSTICS REVIEWED AT THIS VISIT:  Vital signs reviewed in [facility] EMR.  [facility] medical records, notes, lab and imaging results reviewed. And diagnostics available in rehab records/[facility].  Medication reconciliation completed.      Dated:  4/16/24  CBC W/DIFF  WBC 4.1 10'3/uL 3.5-10.5 Final  RBC 3.97 10'6/uL (Based on  documented  legal sex) 3.80-  5.20  Final  HGB 12.8 g/dL (Based on  documented  legal sex) 11.6-  15.4  Final  HCT 36.1 % (Based on  documented  legal sex) 34.0-  45.0  Final  MCV 90.9 fL 80.0-99.0 Final  MCH 32.2 pg 27.0-34.0 Final  MCHC 35.5 g/dL 32.0-35.5 Final  RDW 16.8 % 11.0-15.0 H Final   10'3/uL 150-400 Final  MPV 12.9 fL 8.8-12.1 H Final  NRBC's 0.0 % 0.0 Final  Absolute NRBCs 0.0 10'3/uL No reference  range  established  Final  Neutrophils 54.8 % 34.0-73.0 Final  Lymphocytes 21.4 % 15.0-50.0 Final  Monocytes 18.4 % 1.0-15.0 H Final  Eosinophils 3.2 % 0.0-8.0 Final  Basophils 1.0 % 0.0-2.0 Final  Immature Granulocytes 1.2 % No defined  reference range  Final  Absolute Neutrophils 2.2 10'3/uL 1.5-8.0 Final  Absolute Lymphocytes 0.9 10'3/uL 1.0-4.0 L Final  Absolute Monocytes 0.8 10'3/uL 0.2-1.0 Final  Absolute Eosinophils 0.1 10'3/uL 0.0-0.6 Final  Absolute Basophils 0.0 10'3/uL 0.0-0.3 Final  Absolute Immature Granulocytes 0.1 10'3/uL 0.00-0.10 Final    Magnesium 1.7 mg/dL 1.7-2.8 Final    CMP(COMPREHENSIVE METABOLIC PANEL)  Sodium 141 mmol/L 133-146 Final  Potassium 3.7 mmol/L 3.5-5.1 Final  Chloride 101 mmol/L  Final  Carbon Dioxide 32 mmol/L 21-31 H Final  Anion Gap 8 mmol/L 4-13 Final  Blood Urea Nitrogen 18 mg/dL 7-25 Final  Creatinine 0.40 mg/dL 0.60-1.30 L Final  eGFRcr (CKD-EPI 2021) >90 mL/min/1.73  m2  >=60 Final  Calcium 9.1 mg/dL 8.3-10.5 Final  Glucose 69 mg/dL  L Final  Protein,  Total 5.5 g/dL 6.4-8.3 L Final  Albumin 3.3 g/dL 3.5-5.0 L Final  ALT 13 units/L 9-43 Final  Alkaline Phosphatase 214 units/L  H Final  AST 22 units/L 13-39 Final  Bilirubin, Total 0.4 mg/dL 0.2-1.2 Final    Vitamin D, 25-Hydroxy, Total 32.2 ng/mL 30.0-100.0 Final    SEE PLAN BELOW  Weakness/MS with Progressive Worsening/Physical Deconditioning/Impaired mobility and ADLs/At risk for falling  -Fall Precautions  -PT/OT/ST to evaluate and treat  -Tylenol 650 mg q6h prn for fever/pain, if given for fever, notify MD/NP  -[facility] team to establish care plan meeting with patient and POA/family as appropriate  -Anticipate DC on or before TBD; SW to assist patient/family w/ DC planning  -DC Plan:  TBD    Chronic Dizziness  -Meclizine 12.5 mg bid     Bilateral lower extremity edema d/t lymphedema  -Acewraps to B/L le while sitting up in wheelchair  -Bumex 1 mg every day  -Daily weights, notify MD/NP for overnight wt gain of >2lb or >5lb in one week  -FR 1500 ml every day  -DEE DEE diet    Buttock Open Areas  -Wound care consult  -Right Open areas, per wound care team  -Wound Healing Protein   -Triamcinolone cream, per wound care team    HTN  -Vitals q shift  -Bumex 1 mg every day  -Daily weights, notify MD/NP for overnight wt gain of >2lb or >5lb in one week  -FR 1500 ml every day  -DEE DEE diet    Hypothyroidism  -Last TSH: 1.66 on 5/16/23  -Last T4,Free: 1.39 on 5/16/23  -Levothyroxine 100 mcq every day    Acute Respiratory Failure with Hypoxia d/t Pulmonary Edema/CAP  -Azithromycin 250 mg every day-->stop 4/20/24    Remote H/o breast cancer  -Anastrozole stopped years ago per patient     UTI  -Completed Rocephin IV in hospital  -Continue to monitor    DVT Prophylaxis   -Offered Lovenox, declined by POA, son  -Encourage early mobilization and participation in PT/OT as able    Bowel Management Regimen/Constipation   -Bisacodyl 5 mg every day prn     Vitamin D def  -Ergocalciferol 31385 units q weekly    Supplements  -MVI every  day  -Wound healing protein    LABS  -CBC/CMP weekly while in [facility]    Follow Up Appointments  Dr. Kaitlin Lo, PCP within 1-2 weeks following [facility] discharge.   *Follow-Up with specialists as recommended.  No future appointments.    *Greater than 65 minutes spent w/ patient and family, reviewing medical records, labs, completing medication reconciliation and entering orders to establish plan of care in [facility].    Marshall Fallon, LEANNE  04/19/24

## 2024-04-23 ENCOUNTER — SNF DISCHARGE (OUTPATIENT)
Dept: INTERNAL MEDICINE CLINIC | Age: 84
End: 2024-04-23

## 2024-04-23 VITALS
RESPIRATION RATE: 20 BRPM | WEIGHT: 160.81 LBS | TEMPERATURE: 97 F | BODY MASS INDEX: 29 KG/M2 | DIASTOLIC BLOOD PRESSURE: 68 MMHG | SYSTOLIC BLOOD PRESSURE: 107 MMHG | HEART RATE: 74 BPM | OXYGEN SATURATION: 94 %

## 2024-04-23 DIAGNOSIS — Z74.09 IMPAIRED MOBILITY AND ADLS: ICD-10-CM

## 2024-04-23 DIAGNOSIS — I89.0 LYMPHEDEMA: ICD-10-CM

## 2024-04-23 DIAGNOSIS — S31.819D OPEN WOUND OF RIGHT BUTTOCK, SUBSEQUENT ENCOUNTER: ICD-10-CM

## 2024-04-23 DIAGNOSIS — R42 DIZZINESS: ICD-10-CM

## 2024-04-23 DIAGNOSIS — Z78.9 IMPAIRED MOBILITY AND ADLS: ICD-10-CM

## 2024-04-23 DIAGNOSIS — E55.9 VITAMIN D DEFICIENCY: ICD-10-CM

## 2024-04-23 DIAGNOSIS — J81.0 ACUTE PULMONARY EDEMA (HCC): ICD-10-CM

## 2024-04-23 DIAGNOSIS — G35 MS (MULTIPLE SCLEROSIS) (HCC): ICD-10-CM

## 2024-04-23 DIAGNOSIS — R53.1 WEAKNESS: Primary | ICD-10-CM

## 2024-04-23 DIAGNOSIS — J18.9 COMMUNITY ACQUIRED PNEUMONIA, UNSPECIFIED LATERALITY: ICD-10-CM

## 2024-04-23 PROCEDURE — 99316 NF DSCHRG MGMT 30 MIN+: CPT | Performed by: NURSE PRACTITIONER

## 2024-04-24 NOTE — PROGRESS NOTES
Johanna Wolf, 1940, 83 year old, female is being discharged from Facility: [facility]      DISCHARGE SUMMARY    Date of Admission: 4/15/24    Date of Anticipated Discharge: 24                               Admitting Diagnoses: Weakness, CAP, BLE edema d/t Lymphedema, MS with Progressive Worsening, Hypothyroidism and Acute Respiratory Failure with Hypoxia d/t Pulmonary Edema    Subjective: Johanna Wolf  : 1940, Age 83 year old  female patient with PMHx significant for MS, chronic dizziness, HTN, and bilateral lower extremity lymphedema who was brought into the emergency room from home, after she got progressively weak.  Workup revealed patient was in acute pulmonary edema as well as was found to have UTI.  Given IV Rocephin in the hospital as well as IV diuretics transition to oral Bumex.  Started on oral Zithromax for Pulmonary Edema as well as CAP to be completed on .  She will continue on the Bumex as well.  Patient was evaluated by PT/OT and subacute rehab was recommended.  Transferred to [facility] on 4/15/2024 for rehabilitation and medical management.     Vital signs:  /68   Pulse 74   Temp 96.6 °F (35.9 °C)   Resp 20   Wt 160 lb 12.8 oz (72.9 kg)   SpO2 94%   BMI 29.41 kg/m²     ROS and Physical Exam:    REVIEW OF SYSTEMS:  GENERAL HEALTH:feels well otherwise  SKIN: denies any unusual skin lesions or rashes  WOUNDS: +Rt inner buttock open areas  EYES:no visual complaints or deficits  HENT: denies nasal congestion, sinus pain or sore throat; and hearing loss negative  RESPIRATORY: denies shortness of breath, wheezing or cough   CARDIOVASCULAR:denies chest pain, no palpitations , denies syncope, denies orthopnea, denies cough  GI: denies nausea, vomiting, constipation, diarrhea; no rectal bleeding; no heartburn  :no dysuria, urgency or frequency; no vaginal discharge; no urinary incontinence; no hematuria  MUSCULOSKELETAL:no joint complaints upper or lower  extremities  NEURO:no sensory or motor complaint, denies seizures, denies vertigo, denies tinnitus, and denies tremors  PSYCHE: no symptoms of depression or anxiety  HEMATOLOGY:denies hx anemia, denies bruising, denies excessive bleeding  ENDOCRINE: denies excessive thirst or urination; denies unexpected wt gain or wt loss  ALLERGY/IMM.: denies food or seasonal allergies    PHYSICAL EXAM:  GENERAL HEALTH: well developed, well nourished, in no apparent distress  LINES, TUBES, DRAINS:  none  SKIN: pale, warm, dry  WOUND: +Rt inner buttock open areas, per wound care team  EYES: PERRLA, conjunctiva normal; no drainage from eyes  HENT: normocephalic; normal nose, no nasal drainage, mucous membranes pink, moist  NECK: full range of motion observed  RESPIRATORY:---+Diminished bilaterally; No wheezing/cough/accessory muscle use; on room air  CARDIOVASCULAR: S1, S2 normal, RRR; no S3, no S4; , no click, no murmur  ABDOMEN: normal active BS+, soft, non-distended; no apparent masses; observed, non-tender, no guarding during physical exam  : Deferred  LYMPHATIC: ---+Bilateral Lymphedema  MUSCULOSKELETAL: no acute synovitis upper or lower extremity.  Weakness R/T recent hospitalization/diagnoses/sequelae; will undergo therapies to rehab and improve strength, endurance and independence w/ ADLs as able  EXTREMITIES/VASCULAR: ---+B/L le lymphedema; discolored; no cyanosis, no clubbing  NEUROLOGIC: intact; no sensorimotor deficit, cranial nerves intact II-XII, follows commands, ---unable to move lower extremities w/o assistance  PSYCHIATRIC: alert and oriented x 3; affect appropriate-->very sweet and pleasant    Therapy Updates: 4/23/24  Ambulation:  Unable  ADLs/transfers:  ADLs:  UB: Min/Mod a; LB: Max a; transfer:  Dep, EZ Stand  DC Plan: Home with Live-in Caregivers    Skilled Nursing Facility Course:    Progressing well with PT/OT.  Medically cleared for anticipated discharge  Home with home care services RN/PT/OT/ST/[place]  Aide  Equipment per PT recommendations:  Has her own hospital bed, more lift, standing lift and power chair    Most recent lab results:  Dated:  4/23/24  CBC W/DIFF  WBC 5.6 10'3/uL 3.5-10.5 Final  RBC 4.07 10'6/uL (Based on  documented  legal sex) 3.80-  5.20  Final  HGB 12.0 g/dL (Based on  documented  legal sex) 11.6-  15.4  Final  HCT 36.5 % (Based on  documented  legal sex) 34.0-  45.0  Final  MCV 89.7 fL 80.0-99.0 Final  MCH 29.5 pg 27.0-34.0 Final  MCHC 32.9 g/dL 32.0-35.5 Final  RDW 15.9 % 11.0-15.0 H Final   10'3/uL 150-400 Final  MPV 11.1 fL 8.8-12.1 Final  NRBC's 0.0 % 0.0 Final  Absolute NRBCs 0.0 10'3/uL No reference  range  established  Final  Neutrophils 57.6 % 34.0-73.0 Final  Lymphocytes 26.6 % 15.0-50.0 Final  Monocytes 11.2 % 1.0-15.0 Final  Eosinophils 3.0 % 0.0-8.0 Final  Basophils 0.9 % 0.0-2.0 Final  Immature Granulocytes 0.7 % No defined  reference range  Final  Absolute Neutrophils 3.2 10'3/uL 1.5-8.0 Final  Absolute Lymphocytes 1.5 10'3/uL 1.0-4.0 Final  Absolute Monocytes 0.6 10'3/uL 0.2-1.0 Final  Absolute Eosinophils 0.2 10'3/uL 0.0-0.6 Final  Absolute Basophils 0.1 10'3/uL 0.0-0.3 Final  Absolute Immature Granulocytes 0.0 10'3/uL 0.00-0.10 Final    CMP(COMPREHENSIVE METABOLIC PANEL)  Sodium 140 mmol/L 133-146 Final  Potassium 4.2 mmol/L 3.5-5.1 Final  Chloride 102 mmol/L  Final  Carbon Dioxide 31 mmol/L 21-31 Final  Anion Gap 7 mmol/L 4-13 Final  Blood Urea Nitrogen 37 mg/dL 7-25 H Final  Creatinine 0.46 mg/dL 0.60-1.30 L Final  eGFRcr (CKD-EPI 2021) >90 mL/min/1.73  m2  >=60 Final  Calcium 9.1 mg/dL 8.3-10.5 Final  Glucose 80 mg/dL  Final  Protein, Total 5.8 g/dL 6.4-8.3 L Final  Albumin 3.2 g/dL 3.5-5.0 L Final  ALT 15 units/L 9-43 Final  Alkaline Phosphatase 170 units/L  H Final  AST 19 units/L 13-39 Final  Bilirubin, Total 0.5 mg/dL 0.2-1.2 Final    Discharge Diagnoses w/ current management:  Weakness/MS with Progressive Worsening/Physical  Deconditioning/Impaired mobility and ADLs/At risk for falling  -PT/OT/ST to evaluate and treat with home health  -Tylenol 650 mg q6h prn for fever/pain, if given for fever, notify MD  -Anticipate DC on or before 4/24/24; SW to assist patient/family w/ DC planning  -DC Plan:  Home with Live-in Caregivers     Chronic Dizziness  -Meclizine 12.5 mg bid     Bilateral lower extremity edema d/t lymphedema  -Acewraps to B/L le while sitting up in wheelchair  -Bumex 1 mg every day  -Daily weights, notify MD for overnight wt gain of >2lb or >5lb in one week  -FR 1500 ml every day  -DEE DEE diet     Buttock Open Areas  -Wound care consult  -Right Open areas, per wound care team  -Wound Healing Protein   -Triamcinolone cream, per wound care team     HTN  -Bumex 1 mg every day  -Daily weights, notify MD for overnight wt gain of >2lb or >5lb in one week  -FR 1500 ml every day  -DEE DEE diet     Hypothyroidism  -Last TSH: 1.66 on 5/16/23  -Last T4,Free: 1.39 on 5/16/23  -Levothyroxine 100 mcq every day     Acute Respiratory Failure with Hypoxia d/t Pulmonary Edema/CAP  -Azithromycin-->completed     Remote H/o breast cancer  -Anastrozole stopped years ago per patient      UTI  -Completed Rocephin IV in hospital     DVT Prophylaxis   -Offered Lovenox, declined by POA, son  -Encourage early mobilization and participation in PT/OT as able     Bowel Management Regimen/Constipation   -Bisacodyl 5 mg every day prn     Vitamin D def  -Ergocalciferol 01828 units q weekly     Supplements  -MVI every day  -Wound healing protein     LABS  -Per PCP     Follow Up Appointments  Dr. Kaitlin Lo, PCP within 1-2 weeks following VIOLET discharge.   *Follow-Up with specialists as recommended.  No future appointments.     Medication Reconciliation Completed:  Yes    *Greater than 30 minutes spent in coordination of care in preparation for discharge.    Marshall Fallon, LEANNE  4/23/2024  8:34 PM

## 2025-01-16 ENCOUNTER — OFF PREMISE (OUTPATIENT)
Dept: HEALTH INFORMATION MANAGEMENT | Facility: OTHER | Age: 85
End: 2025-01-16

## 2025-01-17 ENCOUNTER — OFF PREMISE (OUTPATIENT)
Dept: HEALTH INFORMATION MANAGEMENT | Facility: OTHER | Age: 85
End: 2025-01-17

## 2025-01-20 PROCEDURE — 93010 ELECTROCARDIOGRAM REPORT: CPT | Performed by: INTERNAL MEDICINE

## 2025-03-01 PROCEDURE — 93010 ELECTROCARDIOGRAM REPORT: CPT | Performed by: INTERNAL MEDICINE

## (undated) NOTE — MR AVS SNAPSHOT
After Visit Summary   5/12/2017    Shmuel Mathias    MRN: [id number]           Diagnoses this Visit     Multiple sclerosis ([facility]    -  Primary     Osteoporosis due to aromatase inhibitor           Allergies     No Known Allergies      Your Vital Sign

## (undated) NOTE — IP AVS SNAPSHOT
[address]            (For Outpatient Use Only) Initial Admit Date: 6/5/2020   Inpt/Obs Admit Date: Inpt: 6/8/20 / Obs: 06/05/20   Discharge Date:    Kelvin Ovalle:  [de-identified]   MRN: [de-identified]   CSN: [id number]   CEID: [id number] Subscriber ID:  Pt Rel to Subscriber:    Hospital Account Financial Class: Medicare    Joyce 10, 2020

## (undated) NOTE — IP AVS SNAPSHOT
Patient Demographics     Address  [address] [place] [address] Phone  [phone] (Home) *Preferred* E-mail Address  [email or web address]      Emergency Contact(s)     Name Relation Home Work Mobile    Rodolfo Wolf   [phone]      Allergies as of [id number] Enoxaparin Sodium (LOVENOX) 40 MG/0.4ML injection 40 mg 06/09/20 1816 Given              Recent Vital Signs       Most Recent Value   Vitals  100/41 Filed at 06/10/2020 1119   Pulse  85 Filed at 06/10/2020 1119   Resp  22 Filed at 06/10/2020 111 Hosp Day #[PT.1] 0[PT. 2] PCP[PT.1] Micaela Hamman, MD[PT.2]     Chief Complaint:[PT.1] RLE pain[PT.3]    History of Present Illness:[PT.1] Johanna Wolf[PT. 2] is a[PT.3 [de-identified]year old[PT.2] female with[PT.1] a history of multiple sclerosis, lymphedema, Social History:[PT.1]  reports that she has been smoking. She has been smoking about 0.50 packs per day. She has never used smokeless tobacco. She reports that she does not drink alcohol or use drugs. [PT.2]    Family History:[PT.1]   Family History   Probl Lab 06/05/20  0913   TROP 0.197*   [PT.2]       Imaging: Imaging data reviewed in Epic. ASSESSMENT / PLAN:     1. Right groin pain[PT.1], tender on exam, appears to have pulled muscle  1. Muscle relaxants  2. Pain control  3.  PT/OT evaluation  2 Elevated troponin [R79.89]      Reason for Consultation:  Gait difficulty    History of Present Illness:  Yonas Ramos is a a(n) [de-identified]year old female with history of multiple sclerosis in remission, breast cancer, hypothyroidism, chronic lymphedema who is a • TONSILLECTOMY       Family History   Problem Relation Age of Onset   • Cancer Mother 77        Bladder cancer   • Breast Cancer Self 74      reports that she has been smoking. She has been smoking about 0.50 packs per day.  She has never used smokeless to and reactive to light. 3+ brisk bilaterally. EOMs intact. Visual fields are full. Tongue is midline. Uvula and palate elevate symmetrically. Shrug shoulders normally bilaterally. The rest of the cranial nerves are grossly intact.   Sensation is reduc Chest X-Ray Results (HF patients only)    No exam resulted this encounter.       2D Echocardiogram Results (HF patients only)    Card Echo 2d Doppler (cpt=93306)    Result Date: 6/6/2020                                                     *[facility] wall motion    abnormalities. Left ventricular diastolic function parameters were normal    for the patient's age. 2. Left atrium: The left atrium was mildly enlarged.  3. Right ventricle: Systolic function was normal. 4. Pulmonary arteries: Systol was no pericardial effusion. Aorta: Aortic root: The aortic root was normal. Ascending aorta: The ascending aorta was normal. Pulmonary artery:   Systolic pressure was at the upper limits of normal, in the range of 30mm Hg to 35mm Hg.  Systemic veins: Infe Mitral valve                                    Value        Reference  Mitral E-wave peak velocity                     0.71  m/sec  ---------  Mitral A-wave peak velocity                     1.17  m/sec  ---------  Mitral peak gradient, D Number of Visits to Meet Established Goals: 6    Presenting Problem: RLE pain, lymphedema[CK. 2]    History related to current admission: Pt admitted from home with RLE pain, weakness, BLE edema. Pt with groin mm strain, lymphedema.  PMH includes MS, breast Location: R groin and BLE's after activity, which happens, she says  Management Techniques: Relaxation;Repositioning; Activity promotion[CK. 2]    BALANCE[CK.1] VC's for expectations, reassurance, encouragement, t/f tech's. Rec'd pt up in b/s chair via total lift. Seated thera ex as below. Sit <> stand as above, x8 with seated rest between reps.    Mod assist to try to help pt scoot back in b/s chair without Goal #2 Patient is able to demonstrate transfers EOB to/from Chair/Wheelchair at assistance level: moderate assistance      Goal #3 Pt will participate in BLE there ex sitting at EOB.       Goal #4     Goal #5     Goal #6     Goal Comments: Goals establish breast right breast   • Hypothyroidism    • Incontinence     slight incontinence from M.S,  and slight retention problem   • MS (multiple sclerosis) (HCC)     x 21 years    • Muscle weakness    • Neuropathy     right foot from M.s   • Unspecified disorder techniques;Relaxation;Repositioning[BJ.3]    COGNITION[BJ.1]  Overall Cognitive Status:  WFL - within functional limits[BJ.2]    VISION[BJ.1]  Current Vision: Pt reports no changes to vision.[BJ.2]    PERCEPTION[BJ.1]  Overall Perception Status:   WFL - wi throughout tx session. Pt received semi-supine in bed. Pt completed bed mobility supine <> sit DEP A utilizing bed railing. Pt facilitated in donning R LE orthotic shoe TOTAL A in preparation for transfer.      Pt completed 2x transfers sit <> stand MOD A Client Assessment/Performance Deficits[BJ. 1] MODERATE - Comorbidities and min to mod modifications of tasks[BJ.2]    Clinical Decision Making[BJ. 1] MODERATE - Analysis of occupational profile, detailed assessments, several treatment options[BJ.2]    Overal Patient/Family Long Term Goal   (Resolved)     Interdisciplinary Completed    Description:  Patient's Long Term Goal: discharge     Interventions:  - manage pain   -Work with PT/OT if they are on to see her   -Talk to social work on home planning   - See a